# Patient Record
Sex: FEMALE | Race: WHITE | NOT HISPANIC OR LATINO | Employment: OTHER | URBAN - METROPOLITAN AREA
[De-identification: names, ages, dates, MRNs, and addresses within clinical notes are randomized per-mention and may not be internally consistent; named-entity substitution may affect disease eponyms.]

---

## 2024-02-21 ENCOUNTER — MEDICAL CORRESPONDENCE (OUTPATIENT)
Dept: HEALTH INFORMATION MANAGEMENT | Facility: CLINIC | Age: 69
End: 2024-02-21

## 2024-02-26 ENCOUNTER — TRANSCRIBE ORDERS (OUTPATIENT)
Dept: OTHER | Age: 69
End: 2024-02-26

## 2024-02-26 DIAGNOSIS — N28.89 RENAL MASS: Primary | ICD-10-CM

## 2024-02-27 ENCOUNTER — PATIENT OUTREACH (OUTPATIENT)
Dept: UROLOGY | Facility: CLINIC | Age: 69
End: 2024-02-27

## 2024-02-27 NOTE — TELEPHONE ENCOUNTER
Action 24  ZI 12:38 PM    Action Taken  STAT request faxed to [facility] [employer] ([phone]) for imaging to be pushed.     @2:44 PM, [facility] called [facility] and spoke with Sujata. Sujata confirmed images will be pushed today.     MEDICAL RECORDS REQUEST   [facility]  Urology Clinic  [address] [place] [address]  PHONE: [phone]  Fax: [phone]        FUTURE VISIT INFORMATION                                                   Yolanda Edwards, : 1955 scheduled for future visit at [facility]    APPOINTMENT INFORMATION:  Date: 2024  Provider:  Valerie Covington MD  Reason for Visit/Diagnosis: KIDNEY MASS    REFERRAL INFORMATION:  Referring provider:  Guillermina Will MD   Specialty: Urology  Referring providers clinic:  [place], [place]  Clinic contact number:  [phone]     RECORDS REQUESTED FOR VISIT                                                     NOTES  STATUS/DETAILS   OFFICE NOTE from referring provider  yes - [facility] Dr. Will: 24, 24, 23, 23, 22, 22, 22, 21   MEDICATION LIST  yes - [facility] progress notes   KIDNEY MASS     [facility]   CT ABDOMEN/PELVIS/RENAL (IMAGES & REPORT)  yes - [facility]  24, 23, 23, 22, 22, 21, 3/9/21, 20     PRE-VISIT CHECKLIST      Joint diagnostic appointment coordinated correctly          (ensure right order & amount of time) Yes   RECORD COLLECTION COMPLETE YES

## 2024-02-27 NOTE — TELEPHONE ENCOUNTER
Pt returned writers call regarding the urgent referral for renal mass.     Pt has been scheduled via video visit 2/28 at 8:00    Will continue to follow as needed.

## 2024-02-28 ENCOUNTER — VIRTUAL VISIT (OUTPATIENT)
Dept: UROLOGY | Facility: CLINIC | Age: 69
End: 2024-02-28
Payer: COMMERCIAL

## 2024-02-28 ENCOUNTER — PRE VISIT (OUTPATIENT)
Dept: UROLOGY | Facility: CLINIC | Age: 69
End: 2024-02-28

## 2024-02-28 DIAGNOSIS — N28.89 RENAL MASS: ICD-10-CM

## 2024-02-28 PROCEDURE — 99204 OFFICE O/P NEW MOD 45 MIN: CPT | Mod: 95 | Performed by: UROLOGY

## 2024-02-28 RX ORDER — CEFAZOLIN SODIUM 2 G/50ML
2 SOLUTION INTRAVENOUS SEE ADMIN INSTRUCTIONS
Status: CANCELLED | OUTPATIENT
Start: 2024-02-28

## 2024-02-28 RX ORDER — CEFAZOLIN SODIUM 2 G/50ML
2 SOLUTION INTRAVENOUS
Status: CANCELLED | OUTPATIENT
Start: 2024-02-28

## 2024-02-28 NOTE — LETTER
2/28/2024       RE: Yolanda Edwards  [address] [address]     Dear Colleague,    Thank you for referring your patient, Yolanda Edwards, to the [facility]OLOGY [facility] [place] [facility]. Please see a copy of my visit note below.    Virtual Visit Details    Type of service:  Video Visit     Originating Location (pt. Location): Home    Distant Location (provider location):  On-site  Platform used for Video Visit: [facility]    Urology Oncology Clinic   Renal mass             Chief Complaint:   Left renal mass            Consult or Referral:     Yolanda Edwards is a 68 year old female seen in consultation.         History of Present Illness:    Yolanda Edwards is a very pleasant 68 year old female who underwent cross-sectional imaging which revealed an enhancing left renal mass measured at 2.6 cm.  This showed significant growth since 2021 1.2 cm.  She met with Dr. Guillermina Will who recommended treatment at a tertiary referral center and therefore she was referred to me for further management.    she does not have a history of previous abdominal or retroperitoneal surgery.  There is not a family history of renal cell cancer.  She does not smoke but is exposed to second hand smoking     ECOG Performance Score: 0 - Independent           Past Medical History:   No past medical history on file.         Past Surgical History:   No past surgical history on file.         Problem List:   There are no problems to display for this patient.           Medications     No current outpatient medications on file.     No current facility-administered medications for this visit.            Family History:   No family history on file.         Social History:     Social History     Socioeconomic History    Marital status: Single     Spouse name: Not on file    Number of children: Not on file    Years of education: Not on file    Highest education level: Not on  file   Occupational History    Not on file   Tobacco Use    Smoking status: Not on file    Smokeless tobacco: Not on file   Substance and Sexual Activity    Alcohol use: Not on file    Drug use: Not on file    Sexual activity: Not on file   Other Topics Concern    Not on file   Social History Narrative    Not on file     Social Determinants of Health     Financial Resource Strain: Not on file   Food Insecurity: Not on file   Transportation Needs: Not on file   Physical Activity: Not on file   Stress: Not on file   Social Connections: Not on file   Interpersonal Safety: Not on file   Housing Stability: Not on file            Allergies:   Patient has no allergy information on record.         Review of Systems:  From intake questionnaire     Negative 14 system review except as noted on HPI, nurse's note see below.         Physical Exam:     Patient is a 68 year old  female There is no height or weight on file to calculate BMI.  Constitutional: Vitals: There were no vitals taken for this visit.  General Appearance Adult: Alert, no acute distress, oriented  HENT: throat/mouth:normal, good dentition  Neck: No adenopathy,masses or thyromegaly  Lungs/Repiratory: no respiratory distress, or pursed lip breathing  Heart: No obvious jugular venous distension present, normal heart rate  Abdomen: soft, nontender, no organomegaly or masses  Hematologic/Lymphatics: No cervical or supraclavicular adenopathy  Musculoskeltal: extremities normal, no peripheral edema  Skin: no noted suspicious lesions or rashes  Neuro: Alert, oriented, speech and mentation normal  Psych: affect and mood normal  Gait: Normal without assistance  : deferred          Labs and Pathology:    I personally reviewed all applicable laboratory and pathology data reviewed with patient    Outside labs 4/11/2023   Ref Range & Units 10 mo ago   Sodium  136 - 146 mmol/L 139   Potassium  3.5 - 5.1 mmol/L 4.5   Chloride  98 - 107 mmol/L 106   CO2  22 - 29 mmol/L 27    Anion Gap  10.0 - 20.0 mmol/L 10.5   Creatinine  0.57 - 1.11 mg/dL 0.93   Blood Urea Nitrogen  10.0 - 20.0 mg/dL 14.9   BUN/Creatinine Ratio  11.70 - 22.90 ratio 16.02   Calcium, Total  8.6 - 10.5 mg/dL 9.8   Glucose  70 - 100 mg/dL 100   eGFR  >=60 mL/min/1.73m(2) >60     WBC Count, Corrected  3.7 - 12.1 10(3)/uL 6.7   RBC Count  3.76 - 5.39 10(6)/uL 4.07   Hemoglobin  11.2 - 15.8 g/dL 12.3   Hematocrit  33.9 - 47.5 % 37.2   MCV  80.6 - 98.5 fL 91.5   MCH  26.4 - 32.9 pg 30.2   MCHC  32.0 - 36.0 g/dL 32.9   RDW  10.0 - 16.8 % 13.3   Platelets  179 - 450 10(3)/uL 252   MPV  7.4 - 10.4 fL 8.4   Resulting Agency [facility] - [place]               Imaging:    I personally viewed all applicable imaging and interpreted them and went over the results with the patient.  Mass/lesion details are as follows    The mass/lesion is located in the Left side and is primarily located in the upper pole.  The tumor is also 50 % endophytic and exophytic.  The mass/lesion primarily lies on the anterior surface.  With regard to the collecting system, the edge of the tumor arrives either abuts the collecting system or arrives within 4 mm of the collecting system.        Outside and Past Medical records:    I spent 20 minutes reviewing outside and past medical records including including the notes from Dr. Guillermina Will on 2/21/24 and labs and imaging listed above          Assessment and Plan:     Assessment:  68 year old female with enhancing Mass Concerning for Renal Cell Cancer      We had a long conversation about the differential diagnosis.  She understands there is a 70 to 80% risk of kidney cancer.  Again discussed different treatment options.  She understand all her questions were answered to her satisfaction.Given the growth in the size of the lesion and the location of it, she is not a good candidate for active surveillance or ablation.  We then discussed surgical options including partial versus radical  nephrectomy.  I explained to her that given the location of the tumor and its close proximity to main renal vessels and also the depth of tumor growth, there is a high likelihood that we might need to perform a radical nephrectomy.  However, I will do my best to do a partial nephrectomy as a first option.  The risks of the procedure including but not limited to infection, bleeding, damage to surrounding structures, urine leak, and more serious side effect such as DVT, PE, and heart attack and stroke were discussed.  Will need to obtain a chest CT scan to complete the staging process.  This will be done locally per patient request.          Plan:  Schedule for robotic left partial versus radical nephrectomy  Obtain CT chest to complete the staging  Scheduled for PAC visit      45 total minutes spent on the date of the encounter including direct interaction with the patient, performing chart review, documentation and further activities as noted above      Valerie Covington MD   Department of Urology   [facility]      4 = No assist / stand by assistance

## 2024-02-29 ENCOUNTER — TELEPHONE (OUTPATIENT)
Dept: UROLOGY | Facility: CLINIC | Age: 69
End: 2024-02-29
Payer: COMMERCIAL

## 2024-02-29 ENCOUNTER — PATIENT OUTREACH (OUTPATIENT)
Dept: UROLOGY | Facility: CLINIC | Age: 69
End: 2024-02-29
Payer: COMMERCIAL

## 2024-02-29 DIAGNOSIS — N28.89 RENAL MASS: Primary | ICD-10-CM

## 2024-02-29 NOTE — TELEPHONE ENCOUNTER
Pre-Op Teaching Flowsheet       Pre and Post op Patient Education  Relevant Diagnosis:  renal mass  Surgical procedure:  nephrectomy  Teaching Topic:  Pre and post op teaching  Person Involved in teaching: Yes    Motivation Level:  Asks Questions: Yes  Eager to Learn: Yes  Cooperative: Yes  Receptive (willing/able to accept information):  Yes    Patient demonstrates understanding of the following:  Date of surgery:  4/9  Location of surgery:  [facility]  History and Physical and any other testing necessary prior to surgery: Yes  Required timeline for completion of History and Physical and any pre-op testing: Yes    Patient demonstrates understanding of the following:  Pre-op bowel prep:  N/A  Pre-op showering/scrub information with PCMX Soap: Yes  Blood thinner medications discussed and when to stop (if applicable):  Yes  Discussed no visitors at this time due to increase Covid-19 cases and how we need to make sure everyone stays safe.    Infection Prevention:   Patient demonstrates understanding of the following:  Surgical procedure site care taught: Yes  Signs and symptoms of infection taught: Yes      Post-op follow-up:  Discussed how to contact the hospital, nurse, and clinic scheduling staff if necessary. (See packet information)    Instructional materials used/given/mailed:  [facility] Surgery Packet, post op teaching sheet, Map, Soap, and with the arrival/location information to come closer to the surgery date.    Surgical instructions packet given to patient in office: will be mailed    Follow up: Discussed arranging for someone to drive you home. ( No public transportation)  Yes  Someone needed to stay the first twenty hours after surgery: Yes     referral: N/A     home:  Yes    Care Giver:  yes    PCP:  yes    Alba Marsh RN  02/29/24  4:30 PM

## 2024-02-29 NOTE — TELEPHONE ENCOUNTER
Called patient to discuss surgery scheduling with Dr. Covington.  Surgery scheduled for 4/9 at [facility] with Dr. Covington.    H&P scheduled with PAC for 3/20 in person. Patient is aware they will get their arrival time for surgery at PAC appointment.    Provider requesting a 10 day face to face post-op appointment.    Writer will mail surgery packet / Surgery packet provided in clinic.    Patient was under the impression she was going to have an xray and not a CT scan. She would like a call back to clarify why she is having a CT scan.    Writer will mail surgery packet.     Jacquelin Carreno on 2/29/2024 at 3:33 PM

## 2024-02-29 NOTE — TELEPHONE ENCOUNTER
Pt reached out to writer stating she has orders for surgery and wanting to get it scheduled ASAP.     Writer informed pt that orders were placed and a message will be sent to the scheduling team.     Pt would also like her imaging orders to be faxed to [facility] in [place]. Writer will fax orders to .    Will continue to follow as needed.

## 2024-03-01 ENCOUNTER — PATIENT OUTREACH (OUTPATIENT)
Dept: UROLOGY | Facility: CLINIC | Age: 69
End: 2024-03-01
Payer: COMMERCIAL

## 2024-03-01 NOTE — TELEPHONE ENCOUNTER
Writer reached out to pt to inform her that the CT scan that was scheduled the same day as her preop, is really early in the AM.     Pt is opting to go with [facility] in [place].     Will continue to follow as needed.

## 2024-03-01 NOTE — TELEPHONE ENCOUNTER
FUTURE VISIT INFORMATION      SURGERY INFORMATION:  Date: 4/9/24  Location: uu or  Surgeon:  Valerie Covington MD   Anesthesia Type:  general  Procedure: Robot assisted Laparoscopic Partial NEPHRECTOMY possible open   Consult: virtual visit 2/28/24    RECORDS REQUESTED FROM:         Primary Care Provider: Linda Andres MD  - [facility]    Most recent EKG+ Tracing: 3/12/21- [place]

## 2024-03-13 ENCOUNTER — ANESTHESIA EVENT (OUTPATIENT)
Dept: SURGERY | Facility: CLINIC | Age: 69
End: 2024-03-13
Payer: COMMERCIAL

## 2024-03-19 LAB
ABO/RH(D): NORMAL
ANTIBODY SCREEN: NEGATIVE
SPECIMEN EXPIRATION DATE: NORMAL

## 2024-03-20 ENCOUNTER — LAB (OUTPATIENT)
Dept: LAB | Facility: CLINIC | Age: 69
End: 2024-03-20
Payer: COMMERCIAL

## 2024-03-20 ENCOUNTER — OFFICE VISIT (OUTPATIENT)
Dept: SURGERY | Facility: CLINIC | Age: 69
End: 2024-03-20
Payer: COMMERCIAL

## 2024-03-20 ENCOUNTER — PRE VISIT (OUTPATIENT)
Dept: SURGERY | Facility: CLINIC | Age: 69
End: 2024-03-20

## 2024-03-20 VITALS
SYSTOLIC BLOOD PRESSURE: 161 MMHG | TEMPERATURE: 97.4 F | DIASTOLIC BLOOD PRESSURE: 76 MMHG | HEART RATE: 63 BPM | WEIGHT: 102 LBS | HEIGHT: 60 IN | BODY MASS INDEX: 20.03 KG/M2 | OXYGEN SATURATION: 100 %

## 2024-03-20 DIAGNOSIS — Z01.818 PREOP EXAMINATION: ICD-10-CM

## 2024-03-20 DIAGNOSIS — Z01.818 PREOP EXAMINATION: Primary | ICD-10-CM

## 2024-03-20 DIAGNOSIS — N28.89 RENAL MASS: ICD-10-CM

## 2024-03-20 LAB
ALBUMIN SERPL BCG-MCNC: 4.4 G/DL (ref 3.5–5.2)
ALP SERPL-CCNC: 78 U/L (ref 40–150)
ALT SERPL W P-5'-P-CCNC: 30 U/L (ref 0–50)
ANION GAP SERPL CALCULATED.3IONS-SCNC: 9 MMOL/L (ref 7–15)
AST SERPL W P-5'-P-CCNC: 35 U/L (ref 0–45)
BASOPHILS # BLD AUTO: 0.1 10E3/UL (ref 0–0.2)
BASOPHILS NFR BLD AUTO: 1 %
BILIRUB SERPL-MCNC: 0.3 MG/DL
BUN SERPL-MCNC: 17.1 MG/DL (ref 8–23)
CALCIUM SERPL-MCNC: 9.7 MG/DL (ref 8.8–10.2)
CHLORIDE SERPL-SCNC: 104 MMOL/L (ref 98–107)
CREAT SERPL-MCNC: 0.86 MG/DL (ref 0.51–0.95)
DEPRECATED HCO3 PLAS-SCNC: 27 MMOL/L (ref 22–29)
EGFRCR SERPLBLD CKD-EPI 2021: 72 ML/MIN/1.73M2
EOSINOPHIL # BLD AUTO: 0.1 10E3/UL (ref 0–0.7)
EOSINOPHIL NFR BLD AUTO: 1 %
ERYTHROCYTE [DISTWIDTH] IN BLOOD BY AUTOMATED COUNT: 12.8 % (ref 10–15)
GLUCOSE SERPL-MCNC: 104 MG/DL (ref 70–99)
HCT VFR BLD AUTO: 39.2 % (ref 35–47)
HGB BLD-MCNC: 12.9 G/DL (ref 11.7–15.7)
IMM GRANULOCYTES # BLD: 0 10E3/UL
IMM GRANULOCYTES NFR BLD: 0 %
LYMPHOCYTES # BLD AUTO: 0.9 10E3/UL (ref 0.8–5.3)
LYMPHOCYTES NFR BLD AUTO: 12 %
MCH RBC QN AUTO: 31 PG (ref 26.5–33)
MCHC RBC AUTO-ENTMCNC: 32.9 G/DL (ref 31.5–36.5)
MCV RBC AUTO: 94 FL (ref 78–100)
MONOCYTES # BLD AUTO: 0.5 10E3/UL (ref 0–1.3)
MONOCYTES NFR BLD AUTO: 7 %
NEUTROPHILS # BLD AUTO: 6.1 10E3/UL (ref 1.6–8.3)
NEUTROPHILS NFR BLD AUTO: 79 %
NRBC # BLD AUTO: 0 10E3/UL
NRBC BLD AUTO-RTO: 0 /100
PLATELET # BLD AUTO: 262 10E3/UL (ref 150–450)
POTASSIUM SERPL-SCNC: 4.5 MMOL/L (ref 3.4–5.3)
PROT SERPL-MCNC: 7.2 G/DL (ref 6.4–8.3)
RBC # BLD AUTO: 4.16 10E6/UL (ref 3.8–5.2)
SODIUM SERPL-SCNC: 140 MMOL/L (ref 135–145)
WBC # BLD AUTO: 7.7 10E3/UL (ref 4–11)

## 2024-03-20 PROCEDURE — 36415 COLL VENOUS BLD VENIPUNCTURE: CPT | Performed by: PATHOLOGY

## 2024-03-20 PROCEDURE — 86900 BLOOD TYPING SEROLOGIC ABO: CPT | Performed by: CLINICAL NURSE SPECIALIST

## 2024-03-20 PROCEDURE — 85025 COMPLETE CBC W/AUTO DIFF WBC: CPT | Performed by: PATHOLOGY

## 2024-03-20 PROCEDURE — 80053 COMPREHEN METABOLIC PANEL: CPT | Performed by: PATHOLOGY

## 2024-03-20 PROCEDURE — 99203 OFFICE O/P NEW LOW 30 MIN: CPT | Performed by: CLINICAL NURSE SPECIALIST

## 2024-03-20 RX ORDER — SODIUM PHOSPHATE,MONO-DIBASIC 19G-7G/118
1 ENEMA (ML) RECTAL EVERY EVENING
COMMUNITY

## 2024-03-20 RX ORDER — HYDROCODONE BITARTRATE AND ACETAMINOPHEN 5; 325 MG/1; MG/1
1 TABLET ORAL EVERY 4 HOURS PRN
Status: ON HOLD | COMMUNITY
Start: 2024-03-04 | End: 2024-04-10

## 2024-03-20 RX ORDER — ACYCLOVIR 800 MG/1
800 TABLET ORAL
COMMUNITY
Start: 2023-05-02

## 2024-03-20 ASSESSMENT — LIFESTYLE VARIABLES: TOBACCO_USE: 0

## 2024-03-20 ASSESSMENT — ENCOUNTER SYMPTOMS
DYSRHYTHMIAS: 0
SEIZURES: 0

## 2024-03-20 ASSESSMENT — PAIN SCALES - GENERAL: PAINLEVEL: MILD PAIN (3)

## 2024-03-20 NOTE — PATIENT INSTRUCTIONS
Preparing for Your Surgery      Name:  Yolanda Edwards   MRN:  [id number]   :  1955   Today's Date:  3/20/2024       Arriving for surgery:  Surgery date:  24  Arrival time:  12.00PM    Please come to:     Please come to:      [facility] [address]  [address] [place]  [address]      The [facility] Patient /Visitor Ramp is located at [address]. Patients and visitors who self-park will receive the reduced hospital parking rate. If the Patient /Visitor Ramp is full, please follow the signs to the [employer] parking located at the main hospital entrance.     parking is available ( 24 hours/ 7 days a week)    Discounted parking pass options are available for patients and visitors. They can be purchased at the [employer] desk at the main hospital entrance.    -    Stop at the security desk and they will direct surgery patients to the 3rd floor Surgery Waiting Room. [phone]     -  If you are in need of directions, wheelchair or escort please stop at the Information/security desk in the lobby.       What can I eat or drink?  -  You may eat and drink normally up to 8 hours prior to arrival time. (Until 4.00AM)  -  You may have clear liquids until 2 hours prior to arrival time. (Until 10.00AM)    Examples of clear liquids:  Water  Clear broth  Juices (apple, white grape, white cranberry  and cider) without pulp  Noncarbonated, powder based beverages  (lemonade and Duane-Aid)  Sodas (Sprite, 7-Up, ginger ale and seltzer)  Coffee or tea (without milk or cream)  Gatorade    -  No Alcohol or cannabis products for at least 24 hours before surgery.     Which medicines can I take?    Hold Aspirin for 7 days before surgery.   Hold Multivitamins for 7 days before surgery.  Hold Supplements for 7 days before surgery. (Collagen 500/C PO, Glusosamine-chondroitin)  Hold Ibuprofen (Advil, Motrin) for 1 day(s) before surgery--unless  otherwise directed by surgeon.  Hold Naproxen (Aleve) for 4 days before surgery.    -  DO NOT take these medications the day of surgery:  SM Pain Reliever.     -  PLEASE TAKE these medications the day of surgery:  Norco as needed. Zovirax as needed.     How do I prepare myself?  - Please take 2 showers (one the night prior to surgery and one the morning of surgery) using Scrubcare or Hibiclens soap.    Use this soap only from the neck to your toes.     Leave the soap on your skin for one minute--then rinse thoroughly.      You may use your own shampoo and conditioner. No other hair products.   - Please remove all jewelry and body piercings.  - No lotions, deodorants or fragrance.  - No makeup or fingernail polish.   - Bring your ID and insurance card.    -If you use a CPAP machine, please bring the CPAP machine, tubing, and mask to hospital.    -If you have a Deep Brain Stimulator, Spinal Cord Stimulator, or any Neuro Stimulator device---you must bring the remote control to the hospital.      ALL PATIENTS GOING HOME THE SAME DAY OF SURGERY ARE REQUIRED TO HAVE A RESPONSIBLE ADULT TO DRIVE AND BE IN ATTENDANCE WITH THEM FOR 24 HOURS FOLLOWING SURGERY.    Covid testing policy as of 12/06/2022  Your surgeon will notify and schedule you for a COVID test if one is needed before surgery--please direct any questions or COVID symptoms to your surgeon      Questions or Concerns:    - For any questions regarding the day of surgery or your hospital stay, please contact the Pre Admission Nursing Office at [phone].       - If you have health changes between today and your surgery, please call your surgeon.       - For questions after surgery, please call your surgeons office.           Current Visitor Guidelines    You may have 2 visitors in the pre op area.    Visiting hours: 8 a.m. to 8:30 p.m.    Patients confirmed or suspected to have symptoms of COVID 19 or flu:     No visitors allowed for adult patients.   Children  (under age 18) can have 1 named visitor.     People who are sick or showing symptoms of COVID 19 or flu:    Are not allowed to visit patients--we can only make exceptions in special situations.       Please follow these guidelines for your visit:          Please maintain social distance          Masking is optional--however at times you may be asked to wear a mask for the safety of yourself and others     Clean your hands with alcohol hand . Do this when you arrive at and leave the building and patient room,    And again after you touch your mask or anything in the room.     Go directly to and from the room you are visiting.     Stay in the patient s room during your visit. Limit going to other places in the hospital as much as possible     Leave bags and jackets at home or in the car.     For everyone s health, please don t come and go during your visit. That includes for smoking   during your visit.

## 2024-03-20 NOTE — H&P
Pre-Operative H & P     CC:  Preoperative exam to assess for increased cardiopulmonary risk while undergoing surgery and anesthesia.    Date of Encounter: 3/20/2024  Primary Care Physician:  Linda Andres MD    Reason for visit:   Encounter Diagnoses   Name Primary?    Preop examination Yes    Renal mass        HPI  Yolanda Edwards is a 69 year old female who presents for pre-operative H & P in preparation for  Procedure Information       Case: [id number] Date/Time: 04/09/24 1400    Procedure: Robot assisted Laparoscopic Partial NEPHRECTOMY possible open (Left: Abdomen)    Anesthesia type: General    Diagnosis: Renal mass [N28.89]    Pre-op diagnosis: Renal mass [N28.89]    Location: [facility] OR  /  OR    Providers: Valerie Covington MD        History is obtained from the patient, sister, and chart review    Patient who underwent cross-sectional imaging which revealed an enhancing left renal mass measured at 2.6 cm. This showed significant growth since 2021 at 1.2 cm. She was referred to Dr. Covington to discuss surgical management. She was counseled for above procedure.      Her history is otherwise significant for cervical fusions 92, 93 with some persistent pain. Reports episodes of ischemic colitis, and was undergoing work up when renal mass was found. Symptoms include diarrhea, blood in stool, N/V. No recent issues. No meds at this time.      Hx of abnormal bleeding or anti-platelet use: Denies    Menstrual history: No LMP recorded. Patient is postmenopausal.     Past Medical History  Past Medical History:   Diagnosis Date    History of basal cell carcinoma     Ischemic colitis (H24)     Osteoarthritis     Renal mass        Past Surgical History  Past Surgical History:   Procedure Laterality Date    BREAST BIOPSY, CORE RT/LT      CERVICAL FUSION      1992, 1993    TONSILLECTOMY         Prior to Admission Medications  Current Outpatient Medications   Medication Sig Dispense Refill    acyclovir (ZOVIRAX) 800 MG  tablet Take 800 mg by mouth as needed      Collagen-Vitamin C-Biotin (COLLAGEN 1500/C PO) 1 tablet by Other route every evening      diphenhydrAMINE-acetaminophen (SM PAIN RELIEVER PM)  MG tablet Take 1 tablet by mouth at bedtime      glucosamine-chondroitin 500-400 MG CAPS per capsule Take 1 capsule by mouth every evening      HYDROcodone-acetaminophen (NORCO) 5-325 MG tablet Take 1 tablet by mouth every 4 hours as needed         Allergies  Allergies   Allergen Reactions    Morphine Itching, Unknown and Rash       Social History  Social History     Socioeconomic History    Marital status: Single     Spouse name: Not on file    Number of children: Not on file    Years of education: Not on file    Highest education level: Not on file   Occupational History    Not on file   Tobacco Use    Smoking status: Never     Passive exposure: Never    Smokeless tobacco: Never   Substance and Sexual Activity    Alcohol use: Yes     Comment: 1-2 drinks a month    Drug use: Never    Sexual activity: Not on file   Other Topics Concern    Not on file   Social History Narrative    Not on file     Social Determinants of Health     Financial Resource Strain: Not on file   Food Insecurity: Not on file   Transportation Needs: Not on file   Physical Activity: Not on file   Stress: Not on file   Social Connections: Not on file   Interpersonal Safety: Not on file   Housing Stability: Not on file       Family History  Family History   Problem Relation Age of Onset    Diabetes Mother     Cirrhosis Mother         nonalcoholic    Heart Disease Father     Alcoholism Father     Diabetes Father     Hyperlipidemia Father     Hypertension Father     Cerebrovascular Disease Father     Liver Disease Brother     Breast Cancer Maternal Aunt     Liver Disease Maternal Aunt     Anesthesia Reaction No family hx of     Clotting Disorder No family hx of     Bleeding Disorder No family hx of        Review of Systems  The complete review of systems is  negative other than noted in the HPI or here.   Anesthesia Evaluation   Pt has had prior anesthetic. Type: General.    No history of anesthetic complications       ROS/MED HX  ENT/Pulmonary:    (-) tobacco use and recent URI   Neurologic: Comment: History of cervical fusions 92,93   (-) no seizures and no CVA   Cardiovascular:     (+)  - -   -  - -                                 No previous cardiac testing  (-) taking anticoagulants/antiplatelets, LIND and arrhythmias   METS/Exercise Tolerance: >4 METS Comment: Does 1.5 hours of line dancing weekly and practices at home. Walks distance, climb stairs without exertional symptoms   Hematologic:  - neg hematologic  ROS  (-) history of blood clots and history of blood transfusion   Musculoskeletal:  - neg musculoskeletal ROS (+)  arthritis,             GI/Hepatic: Comment: Reports episodes of ischemic colitis, was undergoing work up when renal mass was found. Symptoms include diarrhea, blood in stool, N/V. No recent issues. No meds   (-) GERD   Renal/Genitourinary:  - neg Renal ROS     Endo:  - neg endo ROS     Psychiatric/Substance Use:  - neg psychiatric ROS  (-) psychiatric history   Infectious Disease:  - neg infectious disease ROS     Malignancy:   (+) Malignancy, History of Other and Skin.Skin CA Remission status post Surgery.  Other CA renal mass, likely RCC Active status post.    Other:  - neg other ROS          BP (!) 161/76 (BP Location: Right arm, Patient Position: Sitting, Cuff Size: Adult Regular)   Pulse 63   Temp 97.4  F (36.3  C) (Oral)   Ht 1.524 m (5')   Wt 46.3 kg (102 lb)   SpO2 100%   BMI 19.92 kg/m      Physical Exam   Constitutional: Awake, alert, cooperative, no apparent distress, and appears stated age. Thin. Accompanied by sister.  Eyes: Pupils equal, round and reactive to light, extra ocular muscles intact, sclera clear, conjunctiva normal. Glasses on.  HENT: Normocephalic, oral pharynx with moist mucus membranes, good dentition. No  goiter appreciated.   Respiratory: Clear to auscultation bilaterally, no crackles or wheezing. No cough or obvious dyspnea.  Cardiovascular: Regular rate and rhythm, normal S1 and S2, and no murmur noted. Carotids +2, no bruits. No edema. Palpable pulses to radial  DP and PT arteries.   GI: Normal bowel sounds, soft, non-distended, non-tender, no masses palpated.  Lymph/Hematologic: No cervical lymphadenopathy and no supraclavicular lymphadenopathy.  Genitourinary:  Deferred.   Skin: Warm and dry.  No rashes at anticipated surgical site.   Musculoskeletal: Mildly limited ROM of neck. There is no redness, warmth, or swelling of the joints. Gross motor strength is normal.    Neurologic: Awake, alert, oriented to name, place and time. Cranial nerves II-XII are grossly intact. Gait is normal.   Neuropsychiatric: Calm, cooperative. Normal affect.     Prior Labs/Diagnostic Studies   All labs and imaging personally reviewed     EK Normal sinus rhythm    US carotid, bilateral 10/17/23     IMPRESSION:   No sonographic evidence for significant stenosis of the internal carotid   arteries.     CT Chest 3/10/24    IMPRESSION:   1. Stable benign findings.  No evidence for new metastatic disease in the chest.   2. Left upper pole solid renal mass.  This is not significantly changed since   the 2024 CT.    24 CT a/p    IMPRESSION:   1. Enlarging mass upper pole left kidney medially, suspicious for renal cell   carcinoma.  Oncocytoma is felt to be somewhat less likely, but could create   this appearance.  Tissue sampling suggested.   2. Constipation.   3. No definite acute process identified.  Additional incidental findings as   above.       The patient's records and results personally reviewed by this provider.     Outside records reviewed from: Care Everywhere    LAB/DIAGNOSTIC STUDIES TODAY:    Lab Results   Component Value Date    WBC 7.7 2024     Lab Results   Component Value Date    RBC 4.16  "03/20/2024     Lab Results   Component Value Date    HGB 12.9 03/20/2024     Lab Results   Component Value Date    HCT 39.2 03/20/2024     Lab Results   Component Value Date    MCV 94 03/20/2024     Lab Results   Component Value Date    MCH 31.0 03/20/2024     Lab Results   Component Value Date    MCHC 32.9 03/20/2024     Lab Results   Component Value Date    RDW 12.8 03/20/2024     Lab Results   Component Value Date     03/20/2024     Last Comprehensive Metabolic Panel:  Lab Results   Component Value Date     03/20/2024    POTASSIUM 4.5 03/20/2024    CHLORIDE 104 03/20/2024    CO2 27 03/20/2024    ANIONGAP 9 03/20/2024     (H) 03/20/2024    BUN 17.1 03/20/2024    CR 0.86 03/20/2024    GFRESTIMATED 72 03/20/2024    GT 9.7 03/20/2024     Lab Results   Component Value Date    AST 35 03/20/2024     Lab Results   Component Value Date    ALT 30 03/20/2024     No results found for: \"BILICONJ\"   Lab Results   Component Value Date    BILITOTAL 0.3 03/20/2024     Lab Results   Component Value Date    ALBUMIN 4.4 03/20/2024     Lab Results   Component Value Date    PROTTOTAL 7.2 03/20/2024      Lab Results   Component Value Date    ALKPHOS 78 03/20/2024     Type and screen    Assessment    Yolanda Edwards is a 69 year old female seen as a PAC referral for risk assessment and optimization for anesthesia.    Plan/Recommendations  Pt will be optimized for the proposed procedure.  See below for details on the assessment, risk, and preoperative recommendations    NEUROLOGY  - No history of TIA, CVA or seizure  - Chronic Pain of neck after history of cervical fusions. Occasional use of Norco  On chronic opiates, morphine equivalent = 30 MME/Day   -Post Op delirium risk factors:  Age    ENT  - No current airway concerns.  Will need to be reassessed day of surgery.  Mallampati: II  TM: > 3  Mildly limited neck extension after cervical fusions.     Denies swallowing difficulty    CARDIAC  No cardiac history, " symptoms or meds. BP range tends to be 140/70. Good exercise tolerance with line dancing, walking distance, climbing stairs.   - METS (Metabolic Equivalents)>4    RCRI: 0.9% risk of serious cardiac events    PULMONARY  Denies asthma, cough or shortness of breath  Low risk for GEM  - Tobacco History    History   Smoking Status    Never   Smokeless Tobacco    Never       GI: Denies GERD  Being worked up for ischemic colitis. No active symptoms at this time  PONV Medium Risk  Total Score: 2           1 AN PONV: Pt is Female    1 AN PONV: Patient is not a current smoker        /RENAL  - Baseline Creatinine  0.86    ENDOCRINE    - BMI: Estimated body mass index is 19.92 kg/m  as calculated from the following:    Height as of this encounter: 1.524 m (5').    Weight as of this encounter: 46.3 kg (102 lb).  Healthy Weight (BMI 18.5-24.9)  - No history of Diabetes Mellitus    HEME: VTE risk 3%  Denies personal or family history of blood clots  Denies personal or family history of bleeding disorders  Denies history of blood transfusion  Type and screen drawn today    MSK: OA      Different anesthesia methods/types have been discussed with the patient, but they are aware that the final plan will be decided by the assigned anesthesia provider on the date of service.      The patient is optimized for their procedure. AVS with information on surgery time/arrival time, meds and NPO status given by nursing staff. No further diagnostic testing indicated.      On the day of service:     Prep time: 12 minutes  Visit time: 13 minutes  Documentation time: 13 minutes  ------------------------------------------  Total time: 38 minutes      SHALA Malhotra CNS  Preoperative Assessment Center  [facility]  Phone: [phone]  Fax: [phone]

## 2024-04-08 ASSESSMENT — ENCOUNTER SYMPTOMS
SEIZURES: 0
DYSRHYTHMIAS: 0

## 2024-04-08 ASSESSMENT — LIFESTYLE VARIABLES: TOBACCO_USE: 0

## 2024-04-08 NOTE — ANESTHESIA PREPROCEDURE EVALUATION
Anesthesia Pre-Procedure Evaluation    Patient: Yolanda Edwards   MRN: [id number] : 1955        Procedure : Procedure(s):  Robot assisted Laparoscopic Partial NEPHRECTOMY possible open          Past Medical History:   Diagnosis Date    History of basal cell carcinoma     Ischemic colitis (H24)     Osteoarthritis     Renal mass       Past Surgical History:   Procedure Laterality Date    BREAST BIOPSY, CORE RT/LT      CERVICAL FUSION      ,     TONSILLECTOMY        Allergies   Allergen Reactions    Morphine Itching, Unknown and Rash      Social History     Tobacco Use    Smoking status: Never     Passive exposure: Never    Smokeless tobacco: Never   Substance Use Topics    Alcohol use: Yes     Comment: 1-2 drinks a month      Wt Readings from Last 1 Encounters:   24 46.3 kg (102 lb)        Anesthesia Evaluation   Pt has had prior anesthetic. Type: General.    History of anesthetic complications  - PONV.      ROS/MED HX  ENT/Pulmonary:    (-) tobacco use, asthma, COPD and recent URI   Neurologic: Comment: History of cervical fusions ,93   (-) no seizures and no CVA   Cardiovascular:     (+)  - -   -  - -                                 No previous cardiac testing  (-) taking anticoagulants/antiplatelets, LIND, orthopnea/PND, syncope, arrhythmias and irregular heartbeat/palpitations   METS/Exercise Tolerance: >4 METS Comment: Does 1.5 hours of line dancing weekly and practices at home. Walks distance, climb stairs without exertional symptoms   Hematologic:    (-) history of blood clots and history of blood transfusion   Musculoskeletal:   (+)  arthritis,             GI/Hepatic: Comment: Reports episodes of ischemic colitis, was undergoing work up when renal mass was found. Symptoms include diarrhea, blood in stool, N/V. No recent issues. No meds   (-) GERD   Renal/Genitourinary:       Endo:    (-) Type II DM and thyroid disease   Psychiatric/Substance Use:  - neg psychiatric ROS  (-) psychiatric  "history   Infectious Disease:       Malignancy:   (+) Malignancy, History of Other and Skin.Skin CA Remission status post Surgery.  Other CA renal mass, likely RCC Active status post.    Other:  - neg other ROS          Physical Exam    Airway        Mallampati: II   TM distance: > 3 FB   Neck ROM: full   Mouth opening: > 3 cm    Respiratory Devices and Support         Dental       (+) Minor Abnormalities - some fillings, tiny chips      Cardiovascular          Rhythm and rate: regular and normal     Pulmonary           breath sounds clear to auscultation           OUTSIDE LABS:  CBC:   Lab Results   Component Value Date    WBC 7.7 03/20/2024    HGB 12.9 03/20/2024    HCT 39.2 03/20/2024     03/20/2024     BMP:   Lab Results   Component Value Date     03/20/2024    POTASSIUM 4.5 03/20/2024    CHLORIDE 104 03/20/2024    CO2 27 03/20/2024    BUN 17.1 03/20/2024    CR 0.86 03/20/2024     (H) 03/20/2024     COAGS: No results found for: \"PTT\", \"INR\", \"FIBR\"  POC: No results found for: \"BGM\", \"HCG\", \"HCGS\"  HEPATIC:   Lab Results   Component Value Date    ALBUMIN 4.4 03/20/2024    PROTTOTAL 7.2 03/20/2024    ALT 30 03/20/2024    AST 35 03/20/2024    ALKPHOS 78 03/20/2024    BILITOTAL 0.3 03/20/2024     OTHER:   Lab Results   Component Value Date    GT 9.7 03/20/2024       Anesthesia Plan    ASA Status:  3    NPO Status:  NPO Appropriate    Anesthesia Type: General.     - Airway: ETT   Induction: Intravenous, Propofol.   Maintenance: Balanced.   Techniques and Equipment:     - Airway: Video-Laryngoscope     - Lines/Monitors: 2nd IV, BIS     - Blood: T&S     - Drips/Meds: Phenylephrine     Consents    Anesthesia Plan(s) and associated risks, benefits, and realistic alternatives discussed. Questions answered and patient/representative(s) expressed understanding.     - Discussed: Risks, Benefits and Alternatives for BOTH SEDATION and the PROCEDURE were discussed     - Discussed with:  Patient      - " Extended Intubation/Ventilatory Support Discussed: No.      - Patient is DNR/DNI Status: No     Use of blood products discussed: Yes.     - Discussed with: Patient.     - Consented: consented to blood products     Postoperative Care    Pain management: IV analgesics, Oral pain medications, Multi-modal analgesia.   PONV prophylaxis: Ondansetron (or other 5HT-3), Dexamethasone or Solumedrol, Background Propofol Infusion, Aprepitant     Comments:               Tiffany Donovan MD    I have reviewed the pertinent notes and labs in the chart from the past 30 days and (re)examined the patient.  Any updates or changes from those notes are reflected in this note.

## 2024-04-09 ENCOUNTER — HOSPITAL ENCOUNTER (OUTPATIENT)
Facility: CLINIC | Age: 69
Discharge: HOME | End: 2024-04-11
Attending: UROLOGY | Admitting: UROLOGY
Payer: COMMERCIAL

## 2024-04-09 ENCOUNTER — ANESTHESIA (OUTPATIENT)
Dept: SURGERY | Facility: CLINIC | Age: 69
End: 2024-04-09
Payer: COMMERCIAL

## 2024-04-09 DIAGNOSIS — Z98.890 POST-OPERATIVE STATE: Primary | ICD-10-CM

## 2024-04-09 LAB
ABO/RH(D): NORMAL
ANTIBODY SCREEN: NEGATIVE
GLUCOSE BLDC GLUCOMTR-MCNC: 81 MG/DL (ref 70–99)
SPECIMEN EXPIRATION DATE: NORMAL

## 2024-04-09 PROCEDURE — 50543 LAPARO PARTIAL NEPHRECTOMY: CPT | Mod: LT | Performed by: UROLOGY

## 2024-04-09 PROCEDURE — 50543 LAPARO PARTIAL NEPHRECTOMY: CPT | Performed by: NURSE ANESTHETIST, CERTIFIED REGISTERED

## 2024-04-09 PROCEDURE — 88341 IMHCHEM/IMCYTCHM EA ADD ANTB: CPT | Mod: 26 | Performed by: PATHOLOGY

## 2024-04-09 PROCEDURE — 250N000011 HC RX IP 250 OP 636: Performed by: STUDENT IN AN ORGANIZED HEALTH CARE EDUCATION/TRAINING PROGRAM

## 2024-04-09 PROCEDURE — 710N000010 HC RECOVERY PHASE 1, LEVEL 2, PER MIN: Performed by: UROLOGY

## 2024-04-09 PROCEDURE — 88342 IMHCHEM/IMCYTCHM 1ST ANTB: CPT | Mod: 26 | Performed by: PATHOLOGY

## 2024-04-09 PROCEDURE — 250N000013 HC RX MED GY IP 250 OP 250 PS 637: Performed by: STUDENT IN AN ORGANIZED HEALTH CARE EDUCATION/TRAINING PROGRAM

## 2024-04-09 PROCEDURE — 250N000009 HC RX 250: Performed by: NURSE ANESTHETIST, CERTIFIED REGISTERED

## 2024-04-09 PROCEDURE — 250N000011 HC RX IP 250 OP 636: Performed by: NURSE ANESTHETIST, CERTIFIED REGISTERED

## 2024-04-09 PROCEDURE — 88341 IMHCHEM/IMCYTCHM EA ADD ANTB: CPT | Mod: TC | Performed by: UROLOGY

## 2024-04-09 PROCEDURE — 999N000141 HC STATISTIC PRE-PROCEDURE NURSING ASSESSMENT: Performed by: UROLOGY

## 2024-04-09 PROCEDURE — 250N000011 HC RX IP 250 OP 636: Performed by: UROLOGY

## 2024-04-09 PROCEDURE — 360N000080 HC SURGERY LEVEL 7, PER MIN: Performed by: UROLOGY

## 2024-04-09 PROCEDURE — 272N000001 HC OR GENERAL SUPPLY STERILE: Performed by: UROLOGY

## 2024-04-09 PROCEDURE — 36415 COLL VENOUS BLD VENIPUNCTURE: CPT | Performed by: UROLOGY

## 2024-04-09 PROCEDURE — 76998 US GUIDE INTRAOP: CPT | Mod: 26 | Performed by: UROLOGY

## 2024-04-09 PROCEDURE — 258N000003 HC RX IP 258 OP 636: Performed by: STUDENT IN AN ORGANIZED HEALTH CARE EDUCATION/TRAINING PROGRAM

## 2024-04-09 PROCEDURE — 250N000009 HC RX 250: Performed by: STUDENT IN AN ORGANIZED HEALTH CARE EDUCATION/TRAINING PROGRAM

## 2024-04-09 PROCEDURE — 370N000017 HC ANESTHESIA TECHNICAL FEE, PER MIN: Performed by: UROLOGY

## 2024-04-09 PROCEDURE — 120N000002 HC R&B MED SURG/OB UMMC

## 2024-04-09 PROCEDURE — 50543 LAPARO PARTIAL NEPHRECTOMY: CPT | Performed by: ANESTHESIOLOGY

## 2024-04-09 PROCEDURE — 86900 BLOOD TYPING SEROLOGIC ABO: CPT | Performed by: UROLOGY

## 2024-04-09 PROCEDURE — 88307 TISSUE EXAM BY PATHOLOGIST: CPT | Mod: 26 | Performed by: PATHOLOGY

## 2024-04-09 PROCEDURE — 250N000025 HC SEVOFLURANE, PER MIN: Performed by: UROLOGY

## 2024-04-09 RX ORDER — LABETALOL 20 MG/4 ML (5 MG/ML) INTRAVENOUS SYRINGE
PRN
Status: DISCONTINUED | OUTPATIENT
Start: 2024-04-09 | End: 2024-04-09

## 2024-04-09 RX ORDER — NALOXONE HYDROCHLORIDE 0.4 MG/ML
0.4 INJECTION, SOLUTION INTRAMUSCULAR; INTRAVENOUS; SUBCUTANEOUS
Status: DISCONTINUED | OUTPATIENT
Start: 2024-04-09 | End: 2024-04-11 | Stop reason: HOSPADM

## 2024-04-09 RX ORDER — SODIUM CHLORIDE, SODIUM LACTATE, POTASSIUM CHLORIDE, CALCIUM CHLORIDE 600; 310; 30; 20 MG/100ML; MG/100ML; MG/100ML; MG/100ML
INJECTION, SOLUTION INTRAVENOUS CONTINUOUS
Status: DISCONTINUED | OUTPATIENT
Start: 2024-04-09 | End: 2024-04-09 | Stop reason: HOSPADM

## 2024-04-09 RX ORDER — PROPOFOL 10 MG/ML
INJECTION, EMULSION INTRAVENOUS PRN
Status: DISCONTINUED | OUTPATIENT
Start: 2024-04-09 | End: 2024-04-09

## 2024-04-09 RX ORDER — SODIUM CHLORIDE, SODIUM GLUCONATE, SODIUM ACETATE, POTASSIUM CHLORIDE AND MAGNESIUM CHLORIDE 526; 502; 368; 37; 30 MG/100ML; MG/100ML; MG/100ML; MG/100ML; MG/100ML
INJECTION, SOLUTION INTRAVENOUS CONTINUOUS PRN
Status: DISCONTINUED | OUTPATIENT
Start: 2024-04-09 | End: 2024-04-09

## 2024-04-09 RX ORDER — DIPHENHYDRAMINE HCL 12.5MG/5ML
12.5 LIQUID (ML) ORAL EVERY 6 HOURS PRN
Status: DISCONTINUED | OUTPATIENT
Start: 2024-04-09 | End: 2024-04-09 | Stop reason: HOSPADM

## 2024-04-09 RX ORDER — PROPOFOL 10 MG/ML
INJECTION, EMULSION INTRAVENOUS CONTINUOUS PRN
Status: DISCONTINUED | OUTPATIENT
Start: 2024-04-09 | End: 2024-04-09

## 2024-04-09 RX ORDER — CEFAZOLIN SODIUM/WATER 2 G/20 ML
2 SYRINGE (ML) INTRAVENOUS
Status: COMPLETED | OUTPATIENT
Start: 2024-04-09 | End: 2024-04-09

## 2024-04-09 RX ORDER — CEFAZOLIN SODIUM/WATER 2 G/20 ML
2 SYRINGE (ML) INTRAVENOUS SEE ADMIN INSTRUCTIONS
Status: DISCONTINUED | OUTPATIENT
Start: 2024-04-09 | End: 2024-04-09 | Stop reason: HOSPADM

## 2024-04-09 RX ORDER — ONDANSETRON 2 MG/ML
INJECTION INTRAMUSCULAR; INTRAVENOUS PRN
Status: DISCONTINUED | OUTPATIENT
Start: 2024-04-09 | End: 2024-04-09

## 2024-04-09 RX ORDER — LIDOCAINE 40 MG/G
CREAM TOPICAL
Status: DISCONTINUED | OUTPATIENT
Start: 2024-04-09 | End: 2024-04-09 | Stop reason: HOSPADM

## 2024-04-09 RX ORDER — FENTANYL CITRATE 50 UG/ML
25 INJECTION, SOLUTION INTRAMUSCULAR; INTRAVENOUS EVERY 5 MIN PRN
Status: DISCONTINUED | OUTPATIENT
Start: 2024-04-09 | End: 2024-04-09 | Stop reason: HOSPADM

## 2024-04-09 RX ORDER — LIDOCAINE HYDROCHLORIDE 20 MG/ML
INJECTION, SOLUTION INFILTRATION; PERINEURAL PRN
Status: DISCONTINUED | OUTPATIENT
Start: 2024-04-09 | End: 2024-04-09

## 2024-04-09 RX ORDER — ONDANSETRON 4 MG/1
4 TABLET, ORALLY DISINTEGRATING ORAL EVERY 6 HOURS PRN
Status: DISCONTINUED | OUTPATIENT
Start: 2024-04-09 | End: 2024-04-11 | Stop reason: HOSPADM

## 2024-04-09 RX ORDER — ONDANSETRON 4 MG/1
4 TABLET, ORALLY DISINTEGRATING ORAL EVERY 30 MIN PRN
Status: DISCONTINUED | OUTPATIENT
Start: 2024-04-09 | End: 2024-04-09 | Stop reason: HOSPADM

## 2024-04-09 RX ORDER — ALBUTEROL SULFATE 0.83 MG/ML
2.5 SOLUTION RESPIRATORY (INHALATION) EVERY 4 HOURS PRN
Status: DISCONTINUED | OUTPATIENT
Start: 2024-04-09 | End: 2024-04-09 | Stop reason: HOSPADM

## 2024-04-09 RX ORDER — HYDROMORPHONE HCL IN WATER/PF 6 MG/30 ML
0.4 PATIENT CONTROLLED ANALGESIA SYRINGE INTRAVENOUS EVERY 5 MIN PRN
Status: DISCONTINUED | OUTPATIENT
Start: 2024-04-09 | End: 2024-04-09 | Stop reason: HOSPADM

## 2024-04-09 RX ORDER — SODIUM CHLORIDE 9 MG/ML
INJECTION, SOLUTION INTRAVENOUS CONTINUOUS
Status: DISCONTINUED | OUTPATIENT
Start: 2024-04-09 | End: 2024-04-11 | Stop reason: HOSPADM

## 2024-04-09 RX ORDER — NALOXONE HYDROCHLORIDE 0.4 MG/ML
0.2 INJECTION, SOLUTION INTRAMUSCULAR; INTRAVENOUS; SUBCUTANEOUS
Status: DISCONTINUED | OUTPATIENT
Start: 2024-04-09 | End: 2024-04-11 | Stop reason: HOSPADM

## 2024-04-09 RX ORDER — ACETAMINOPHEN 325 MG/1
975 TABLET ORAL ONCE
Status: COMPLETED | OUTPATIENT
Start: 2024-04-09 | End: 2024-04-09

## 2024-04-09 RX ORDER — ONDANSETRON 2 MG/ML
4 INJECTION INTRAMUSCULAR; INTRAVENOUS EVERY 6 HOURS PRN
Status: DISCONTINUED | OUTPATIENT
Start: 2024-04-09 | End: 2024-04-11 | Stop reason: HOSPADM

## 2024-04-09 RX ORDER — FENTANYL CITRATE 50 UG/ML
50 INJECTION, SOLUTION INTRAMUSCULAR; INTRAVENOUS EVERY 5 MIN PRN
Status: DISCONTINUED | OUTPATIENT
Start: 2024-04-09 | End: 2024-04-09 | Stop reason: HOSPADM

## 2024-04-09 RX ORDER — AMOXICILLIN 250 MG
1 CAPSULE ORAL 2 TIMES DAILY
Status: DISCONTINUED | OUTPATIENT
Start: 2024-04-09 | End: 2024-04-11 | Stop reason: HOSPADM

## 2024-04-09 RX ORDER — OXYCODONE HYDROCHLORIDE 5 MG/1
5 TABLET ORAL EVERY 4 HOURS PRN
Status: DISCONTINUED | OUTPATIENT
Start: 2024-04-09 | End: 2024-04-11 | Stop reason: HOSPADM

## 2024-04-09 RX ORDER — LIDOCAINE 40 MG/G
CREAM TOPICAL
Status: DISCONTINUED | OUTPATIENT
Start: 2024-04-09 | End: 2024-04-11 | Stop reason: HOSPADM

## 2024-04-09 RX ORDER — ACETAMINOPHEN 325 MG/1
975 TABLET ORAL 3 TIMES DAILY
Status: DISCONTINUED | OUTPATIENT
Start: 2024-04-09 | End: 2024-04-11 | Stop reason: HOSPADM

## 2024-04-09 RX ORDER — DEXAMETHASONE SODIUM PHOSPHATE 4 MG/ML
INJECTION, SOLUTION INTRA-ARTICULAR; INTRALESIONAL; INTRAMUSCULAR; INTRAVENOUS; SOFT TISSUE PRN
Status: DISCONTINUED | OUTPATIENT
Start: 2024-04-09 | End: 2024-04-09

## 2024-04-09 RX ORDER — NALOXONE HYDROCHLORIDE 0.4 MG/ML
0.1 INJECTION, SOLUTION INTRAMUSCULAR; INTRAVENOUS; SUBCUTANEOUS
Status: DISCONTINUED | OUTPATIENT
Start: 2024-04-09 | End: 2024-04-09 | Stop reason: HOSPADM

## 2024-04-09 RX ORDER — FENTANYL CITRATE 50 UG/ML
INJECTION, SOLUTION INTRAMUSCULAR; INTRAVENOUS PRN
Status: DISCONTINUED | OUTPATIENT
Start: 2024-04-09 | End: 2024-04-09

## 2024-04-09 RX ORDER — SODIUM CHLORIDE, SODIUM LACTATE, POTASSIUM CHLORIDE, CALCIUM CHLORIDE 600; 310; 30; 20 MG/100ML; MG/100ML; MG/100ML; MG/100ML
INJECTION, SOLUTION INTRAVENOUS CONTINUOUS PRN
Status: DISCONTINUED | OUTPATIENT
Start: 2024-04-09 | End: 2024-04-09

## 2024-04-09 RX ORDER — ONDANSETRON 2 MG/ML
4 INJECTION INTRAMUSCULAR; INTRAVENOUS EVERY 30 MIN PRN
Status: DISCONTINUED | OUTPATIENT
Start: 2024-04-09 | End: 2024-04-09 | Stop reason: HOSPADM

## 2024-04-09 RX ORDER — HYDROMORPHONE HCL IN WATER/PF 6 MG/30 ML
0.2 PATIENT CONTROLLED ANALGESIA SYRINGE INTRAVENOUS EVERY 5 MIN PRN
Status: DISCONTINUED | OUTPATIENT
Start: 2024-04-09 | End: 2024-04-09 | Stop reason: HOSPADM

## 2024-04-09 RX ORDER — HYDROMORPHONE HCL IN WATER/PF 6 MG/30 ML
0.2 PATIENT CONTROLLED ANALGESIA SYRINGE INTRAVENOUS
Status: DISCONTINUED | OUTPATIENT
Start: 2024-04-09 | End: 2024-04-11 | Stop reason: HOSPADM

## 2024-04-09 RX ORDER — GLYCOPYRROLATE 0.2 MG/ML
INJECTION, SOLUTION INTRAMUSCULAR; INTRAVENOUS PRN
Status: DISCONTINUED | OUTPATIENT
Start: 2024-04-09 | End: 2024-04-09

## 2024-04-09 RX ORDER — BUPIVACAINE HYDROCHLORIDE 2.5 MG/ML
INJECTION, SOLUTION INFILTRATION; PERINEURAL PRN
Status: DISCONTINUED | OUTPATIENT
Start: 2024-04-09 | End: 2024-04-09 | Stop reason: HOSPADM

## 2024-04-09 RX ORDER — HYDROMORPHONE HCL IN WATER/PF 6 MG/30 ML
0.1 PATIENT CONTROLLED ANALGESIA SYRINGE INTRAVENOUS
Status: DISCONTINUED | OUTPATIENT
Start: 2024-04-09 | End: 2024-04-11 | Stop reason: HOSPADM

## 2024-04-09 RX ORDER — ESMOLOL HYDROCHLORIDE 10 MG/ML
INJECTION INTRAVENOUS PRN
Status: DISCONTINUED | OUTPATIENT
Start: 2024-04-09 | End: 2024-04-09

## 2024-04-09 RX ORDER — DIPHENHYDRAMINE HYDROCHLORIDE 50 MG/ML
12.5 INJECTION INTRAMUSCULAR; INTRAVENOUS EVERY 6 HOURS PRN
Status: DISCONTINUED | OUTPATIENT
Start: 2024-04-09 | End: 2024-04-09 | Stop reason: HOSPADM

## 2024-04-09 RX ADMIN — LIDOCAINE HYDROCHLORIDE 100 MG: 20 INJECTION, SOLUTION INFILTRATION; PERINEURAL at 13:52

## 2024-04-09 RX ADMIN — SODIUM CHLORIDE, POTASSIUM CHLORIDE, SODIUM LACTATE AND CALCIUM CHLORIDE: 600; 310; 30; 20 INJECTION, SOLUTION INTRAVENOUS at 13:45

## 2024-04-09 RX ADMIN — PROPOFOL 40 MCG/KG/MIN: 10 INJECTION, EMULSION INTRAVENOUS at 14:34

## 2024-04-09 RX ADMIN — HYDROMORPHONE HYDROCHLORIDE 0.2 MG: 0.2 INJECTION, SOLUTION INTRAMUSCULAR; INTRAVENOUS; SUBCUTANEOUS at 18:48

## 2024-04-09 RX ADMIN — HYDROMORPHONE HYDROCHLORIDE 0.2 MG: 0.2 INJECTION, SOLUTION INTRAMUSCULAR; INTRAVENOUS; SUBCUTANEOUS at 19:39

## 2024-04-09 RX ADMIN — FENTANYL CITRATE 25 MCG: 50 INJECTION, SOLUTION INTRAMUSCULAR; INTRAVENOUS at 18:15

## 2024-04-09 RX ADMIN — Medication 20 MG: at 15:07

## 2024-04-09 RX ADMIN — GLYCOPYRROLATE 0.2 MG: 0.2 INJECTION, SOLUTION INTRAMUSCULAR; INTRAVENOUS at 14:44

## 2024-04-09 RX ADMIN — ESMOLOL HYDROCHLORIDE 40 MG: 10 INJECTION, SOLUTION INTRAVENOUS at 13:57

## 2024-04-09 RX ADMIN — ONDANSETRON 4 MG: 2 INJECTION INTRAMUSCULAR; INTRAVENOUS at 17:04

## 2024-04-09 RX ADMIN — FOSAPREPITANT 150 MG: 150 INJECTION, POWDER, LYOPHILIZED, FOR SOLUTION INTRAVENOUS at 14:32

## 2024-04-09 RX ADMIN — OXYCODONE HYDROCHLORIDE 5 MG: 5 TABLET ORAL at 20:35

## 2024-04-09 RX ADMIN — Medication 10 MG: at 16:34

## 2024-04-09 RX ADMIN — HYDROMORPHONE HYDROCHLORIDE 0.2 MG: 0.2 INJECTION, SOLUTION INTRAMUSCULAR; INTRAVENOUS; SUBCUTANEOUS at 22:14

## 2024-04-09 RX ADMIN — SODIUM CHLORIDE: 9 INJECTION, SOLUTION INTRAVENOUS at 18:07

## 2024-04-09 RX ADMIN — FENTANYL CITRATE 25 MCG: 50 INJECTION INTRAMUSCULAR; INTRAVENOUS at 16:24

## 2024-04-09 RX ADMIN — Medication 30 MG: at 14:35

## 2024-04-09 RX ADMIN — HYDROMORPHONE HYDROCHLORIDE 0.2 MG: 0.2 INJECTION, SOLUTION INTRAMUSCULAR; INTRAVENOUS; SUBCUTANEOUS at 19:00

## 2024-04-09 RX ADMIN — Medication 30 MG: at 13:52

## 2024-04-09 RX ADMIN — Medication 2 G: at 14:11

## 2024-04-09 RX ADMIN — SUGAMMADEX 200 MG: 100 INJECTION, SOLUTION INTRAVENOUS at 17:34

## 2024-04-09 RX ADMIN — HYDROMORPHONE HYDROCHLORIDE 0.2 MG: 0.2 INJECTION, SOLUTION INTRAMUSCULAR; INTRAVENOUS; SUBCUTANEOUS at 18:39

## 2024-04-09 RX ADMIN — HYDROMORPHONE HYDROCHLORIDE 0.2 MG: 0.2 INJECTION, SOLUTION INTRAMUSCULAR; INTRAVENOUS; SUBCUTANEOUS at 19:57

## 2024-04-09 RX ADMIN — HYDROMORPHONE HYDROCHLORIDE 0.4 MG: 0.2 INJECTION, SOLUTION INTRAMUSCULAR; INTRAVENOUS; SUBCUTANEOUS at 20:37

## 2024-04-09 RX ADMIN — Medication 10 MG: at 16:24

## 2024-04-09 RX ADMIN — LABETALOL 20 MG/4 ML (5 MG/ML) INTRAVENOUS SYRINGE 5 MG: at 14:47

## 2024-04-09 RX ADMIN — FENTANYL CITRATE 50 MCG: 50 INJECTION INTRAMUSCULAR; INTRAVENOUS at 14:44

## 2024-04-09 RX ADMIN — FENTANYL CITRATE 25 MCG: 50 INJECTION, SOLUTION INTRAMUSCULAR; INTRAVENOUS at 18:25

## 2024-04-09 RX ADMIN — SODIUM CHLORIDE, SODIUM GLUCONATE, SODIUM ACETATE, POTASSIUM CHLORIDE AND MAGNESIUM CHLORIDE: 526; 502; 368; 37; 30 INJECTION, SOLUTION INTRAVENOUS at 14:06

## 2024-04-09 RX ADMIN — ACETAMINOPHEN 975 MG: 325 TABLET, FILM COATED ORAL at 13:19

## 2024-04-09 RX ADMIN — FENTANYL CITRATE 50 MCG: 50 INJECTION INTRAMUSCULAR; INTRAVENOUS at 14:38

## 2024-04-09 RX ADMIN — HYDROMORPHONE HYDROCHLORIDE 0.5 MG: 1 INJECTION, SOLUTION INTRAMUSCULAR; INTRAVENOUS; SUBCUTANEOUS at 16:59

## 2024-04-09 RX ADMIN — PROPOFOL 80 MG: 10 INJECTION, EMULSION INTRAVENOUS at 13:52

## 2024-04-09 RX ADMIN — DEXAMETHASONE SODIUM PHOSPHATE 8 MG: 4 INJECTION, SOLUTION INTRA-ARTICULAR; INTRALESIONAL; INTRAMUSCULAR; INTRAVENOUS; SOFT TISSUE at 13:55

## 2024-04-09 RX ADMIN — PROPOFOL 20 MG: 10 INJECTION, EMULSION INTRAVENOUS at 13:56

## 2024-04-09 RX ADMIN — Medication 20 MG: at 15:56

## 2024-04-09 ASSESSMENT — ACTIVITIES OF DAILY LIVING (ADL)
ADLS_ACUITY_SCORE: 22
ADLS_ACUITY_SCORE: 22
ADLS_ACUITY_SCORE: 20
ADLS_ACUITY_SCORE: 22

## 2024-04-09 ASSESSMENT — COPD QUESTIONNAIRES: COPD: 0

## 2024-04-09 ASSESSMENT — ENCOUNTER SYMPTOMS: ORTHOPNEA: 0

## 2024-04-09 NOTE — OP NOTE
DATE OF SURGERY: 04/09/24  PREOPERATIVE DIAGNOSIS:  Left renal mass.   POSTOPERATIVE DIAGNOSIS: Left renal mass.   PROCEDURE PERFORMED:   1) Left robotic-assisted laparoscopic partial nephrectomy;   2) Laparoscopic ultrasound of renal mass with intraoperative interpretation of imaging   STAFF SURGEON: Valerie Covington MD, present and scrubbed for all critical portions of the procedure, including the entire radiographic portion.  RESIDENT SURGEON: Judi Mancilla MD, Hugo Ortega MD, Giuseppe Ortega MD  ANESTHESIA: General.   ESTIMATED BLOOD LOSS: 150 mL.   DRAINS AND TUBES: Choi catheter; SHANTELL drain.  COMPLICATIONS: None.   DISPOSITION: PACU.   SPECIMENS OBTAINED: Left renal mass  SIGNIFICANT FINDINGS: Tumor was resected with visually negative margins.    HISTORY OF PRESENT ILLNESS: 69M with a history of a left-sided upper pole renal mass. After a discussion of all risks, benefits, and alternatives, the patient elected to undergo definitive management with a robotic partial nephrectomy.  OPERATION PERFORMED:   Informed consent was obtained and the patient was brought to the operating room where general anesthesia was induced. She was given appropriate preoperative antibiotics and positioned in the full flank position where all pressure points were carefully padded and secured. She was then prepped and draped in the usual sterile fashion. We then performed a timeout, verifying the correct patient's site and procedure to be performed.    Incision was made superior and lateral to the umbilicus a the lateral edge of the rectus. After confirmatory drop test, the Veress needle was used for insufflation. We placed a 12 mm assistant port and four 8 mm robotic ports. The robot was docked. The colon was reflected medially to expose the anterior surface of Gerota's fascia. The medial aspect of the kidney was exposed and the ureter and gonadal vein were identified. Kidney was lifted off the psoas muscle and dissection was carried  posteriorly until the renal artery and vein were identified. The hilar vessels were carefully skeletonized. Once this was exposed, Gerota's was opened and the kidney was completely mobilized. The mass location was noted upper pole and was difficult to get to, therefore the entire kidney required mobilization and rotation medially. The ultrasound probe was then used to verify location of mass and outline the borders for dissection.  Artery was then clamped and excised the tumor using sharp dissection. Once the tumor was free, the base of the resection bed using was oversewn using 3-0 V-lock suture x2. At this point we unclamped the hilum; total clamp time was 21 minutes. The defect in the parenchyma was then closed using the interrupred 0-Vicryl sutures for capsular closure with sliding weck technique. A second weck placed for security on each stitch. These were placed over Surgicel and Floseal. Hemostasis appeared excellent.   Specimen was placed in a 10 mm EndoCatch bag.  SHANTELL drain was inserted. Ports were removed. The specimen was removed and sent to pathology. We closed the fascia from the extraction site with 0-Vicryl in a running fashion. Skin was re approximated using 4-0 Monocryl. The wounds were dressed with skin glue. The patient was then awakened and taken to the PACU in stable condition.  Judi Mancilla MD   PGY-5 Urology Resident

## 2024-04-09 NOTE — ANESTHESIA CARE TRANSFER NOTE
Patient: Yolanda Edwards    Procedure: Procedure(s):  Robot assisted Laparoscopic Partial NEPHRECTOMY       Diagnosis: Renal mass [N28.89]  Diagnosis Additional Information: No value filed.    Anesthesia Type:   General     Note:    Oropharynx: spontaneously breathing and oral airway in place  Level of Consciousness: drowsy  Oxygen Supplementation: face mask  Level of Supplemental Oxygen (L/min / FiO2): 4  Independent Airway: airway patency satisfactory and stable  Dentition: dentition unchanged  Vital Signs Stable: post-procedure vital signs reviewed and stable  Report to RN Given: handoff report given  Patient transferred to: PACU  Comments: VSS, report given to RN.    Handoff Report: Identifed the Patient, Identified the Reponsible Provider, Reviewed the pertinent medical history, Discussed the surgical course, Reviewed Intra-OP anesthesia mangement and issues during anesthesia, Set expectations for post-procedure period and Allowed opportunity for questions and acknowledgement of understanding      Vitals:  Vitals Value Taken Time   /62 04/09/24 1748   Temp     Pulse 67 04/09/24 1752   Resp 15 04/09/24 1752   SpO2 100 % 04/09/24 1752   Vitals shown include unfiled device data.    Electronically Signed By: SHALA Flood CRNA  April 9, 2024  5:53 PM

## 2024-04-09 NOTE — ANESTHESIA PROCEDURE NOTES
Airway       Patient location during procedure: OR       Procedure Start/Stop Times: 4/9/2024 1:58 PM  Staff -        Anesthesiologist:  Tiffany Donovan MD       Resident/Fellow: Jacquelin Kay MD       Performed By: resident  Consent for Airway        Urgency: elective  Indications and Patient Condition       Indications for airway management: kain-procedural       Induction type:intravenous       Mask difficulty assessment: 2 - vent by mask + OA or adjuvant +/- NMBA    Final Airway Details       Final airway type: endotracheal airway       Successful airway: ETT - single and Oral  Endotracheal Airway Details        ETT size (mm): 6.5       Cuffed: yes       Successful intubation technique: video laryngoscopy       VL Blade Size: Glidescope 3       Grade View of Cords: 1       Adjucts: stylet       Position: Right       Measured from: lips       Secured at (cm): 21       Bite block used: None    Post intubation assessment        Placement verified by: capnometry, equal breath sounds and chest rise        Number of attempts at approach: 1       Secured with: tape       Ease of procedure: easy       Dentition: Intact and Lips/oral mucosa injury    Medication(s) Administered   Medication Administration Time: 4/9/2024 1:58 PM    Additional Comments       Small lip abrasion on right upper lip treated with lubricating jelly and phenylephrine

## 2024-04-10 LAB
ANION GAP SERPL CALCULATED.3IONS-SCNC: 13 MMOL/L (ref 7–15)
BUN SERPL-MCNC: 15.4 MG/DL (ref 8–23)
CALCIUM SERPL-MCNC: 8.5 MG/DL (ref 8.8–10.2)
CHLORIDE SERPL-SCNC: 101 MMOL/L (ref 98–107)
CREAT SERPL-MCNC: 0.97 MG/DL (ref 0.51–0.95)
DEPRECATED HCO3 PLAS-SCNC: 21 MMOL/L (ref 22–29)
EGFRCR SERPLBLD CKD-EPI 2021: 63 ML/MIN/1.73M2
ERYTHROCYTE [DISTWIDTH] IN BLOOD BY AUTOMATED COUNT: 12.6 % (ref 10–15)
GLUCOSE BLDC GLUCOMTR-MCNC: 142 MG/DL (ref 70–99)
GLUCOSE SERPL-MCNC: 139 MG/DL (ref 70–99)
HCT VFR BLD AUTO: 32.4 % (ref 35–47)
HGB BLD-MCNC: 10.7 G/DL (ref 11.7–15.7)
MCH RBC QN AUTO: 31.1 PG (ref 26.5–33)
MCHC RBC AUTO-ENTMCNC: 33 G/DL (ref 31.5–36.5)
MCV RBC AUTO: 94 FL (ref 78–100)
PLATELET # BLD AUTO: 209 10E3/UL (ref 150–450)
POTASSIUM SERPL-SCNC: 4.4 MMOL/L (ref 3.4–5.3)
RBC # BLD AUTO: 3.44 10E6/UL (ref 3.8–5.2)
SODIUM SERPL-SCNC: 135 MMOL/L (ref 135–145)
WBC # BLD AUTO: 14.8 10E3/UL (ref 4–11)

## 2024-04-10 PROCEDURE — 258N000003 HC RX IP 258 OP 636: Performed by: STUDENT IN AN ORGANIZED HEALTH CARE EDUCATION/TRAINING PROGRAM

## 2024-04-10 PROCEDURE — 250N000013 HC RX MED GY IP 250 OP 250 PS 637

## 2024-04-10 PROCEDURE — 250N000011 HC RX IP 250 OP 636: Performed by: STUDENT IN AN ORGANIZED HEALTH CARE EDUCATION/TRAINING PROGRAM

## 2024-04-10 PROCEDURE — 250N000013 HC RX MED GY IP 250 OP 250 PS 637: Performed by: UROLOGY

## 2024-04-10 PROCEDURE — 85027 COMPLETE CBC AUTOMATED: CPT | Performed by: STUDENT IN AN ORGANIZED HEALTH CARE EDUCATION/TRAINING PROGRAM

## 2024-04-10 PROCEDURE — 250N000013 HC RX MED GY IP 250 OP 250 PS 637: Performed by: STUDENT IN AN ORGANIZED HEALTH CARE EDUCATION/TRAINING PROGRAM

## 2024-04-10 PROCEDURE — 80048 BASIC METABOLIC PNL TOTAL CA: CPT | Performed by: STUDENT IN AN ORGANIZED HEALTH CARE EDUCATION/TRAINING PROGRAM

## 2024-04-10 PROCEDURE — 93005 ELECTROCARDIOGRAM TRACING: CPT

## 2024-04-10 PROCEDURE — 36415 COLL VENOUS BLD VENIPUNCTURE: CPT | Performed by: STUDENT IN AN ORGANIZED HEALTH CARE EDUCATION/TRAINING PROGRAM

## 2024-04-10 RX ORDER — HYDROCODONE BITARTRATE AND ACETAMINOPHEN 5; 325 MG/1; MG/1
1 TABLET ORAL EVERY 12 HOURS PRN
Status: ON HOLD | COMMUNITY
End: 2024-04-11

## 2024-04-10 RX ORDER — METHOCARBAMOL 500 MG/1
500 TABLET, FILM COATED ORAL 4 TIMES DAILY PRN
Qty: 28 TABLET | Refills: 0 | Status: SHIPPED | OUTPATIENT
Start: 2024-04-10

## 2024-04-10 RX ORDER — METHOCARBAMOL 500 MG/1
500 TABLET, FILM COATED ORAL 4 TIMES DAILY PRN
Status: DISCONTINUED | OUTPATIENT
Start: 2024-04-10 | End: 2024-04-11 | Stop reason: HOSPADM

## 2024-04-10 RX ORDER — ACETAMINOPHEN 325 MG/1
650 TABLET ORAL EVERY 4 HOURS PRN
Qty: 100 TABLET | Refills: 0 | Status: SHIPPED | OUTPATIENT
Start: 2024-04-10

## 2024-04-10 RX ORDER — CALCIUM CARBONATE 500 MG/1
500 TABLET, CHEWABLE ORAL DAILY PRN
Status: DISCONTINUED | OUTPATIENT
Start: 2024-04-10 | End: 2024-04-11 | Stop reason: HOSPADM

## 2024-04-10 RX ORDER — OXYCODONE HYDROCHLORIDE 5 MG/1
2.5 TABLET ORAL EVERY 6 HOURS PRN
Qty: 10 TABLET | Refills: 0 | Status: SHIPPED | OUTPATIENT
Start: 2024-04-10

## 2024-04-10 RX ORDER — AMOXICILLIN 250 MG
1-2 CAPSULE ORAL 2 TIMES DAILY
Qty: 45 TABLET | Refills: 0 | Status: SHIPPED | OUTPATIENT
Start: 2024-04-10

## 2024-04-10 RX ADMIN — ONDANSETRON 4 MG: 4 TABLET, ORALLY DISINTEGRATING ORAL at 09:25

## 2024-04-10 RX ADMIN — METHOCARBAMOL 500 MG: 500 TABLET ORAL at 21:14

## 2024-04-10 RX ADMIN — ACETAMINOPHEN 975 MG: 325 TABLET, FILM COATED ORAL at 00:22

## 2024-04-10 RX ADMIN — DOCUSATE SODIUM 50 MG AND SENNOSIDES 8.6 MG 1 TABLET: 8.6; 5 TABLET, FILM COATED ORAL at 07:59

## 2024-04-10 RX ADMIN — ACETAMINOPHEN 975 MG: 325 TABLET, FILM COATED ORAL at 13:33

## 2024-04-10 RX ADMIN — HYDROMORPHONE HYDROCHLORIDE 0.2 MG: 0.2 INJECTION, SOLUTION INTRAMUSCULAR; INTRAVENOUS; SUBCUTANEOUS at 22:19

## 2024-04-10 RX ADMIN — CALCIUM CARBONATE (ANTACID) CHEW TAB 500 MG 500 MG: 500 CHEW TAB at 21:30

## 2024-04-10 RX ADMIN — OXYCODONE HYDROCHLORIDE 5 MG: 5 TABLET ORAL at 07:59

## 2024-04-10 RX ADMIN — OXYCODONE HYDROCHLORIDE 5 MG: 5 TABLET ORAL at 04:13

## 2024-04-10 RX ADMIN — ACETAMINOPHEN 975 MG: 325 TABLET, FILM COATED ORAL at 21:14

## 2024-04-10 RX ADMIN — DOCUSATE SODIUM 50 MG AND SENNOSIDES 8.6 MG 1 TABLET: 8.6; 5 TABLET, FILM COATED ORAL at 21:14

## 2024-04-10 RX ADMIN — OXYCODONE HYDROCHLORIDE 5 MG: 5 TABLET ORAL at 13:34

## 2024-04-10 RX ADMIN — OXYCODONE HYDROCHLORIDE 5 MG: 5 TABLET ORAL at 00:25

## 2024-04-10 RX ADMIN — DOCUSATE SODIUM 50 MG AND SENNOSIDES 8.6 MG 1 TABLET: 8.6; 5 TABLET, FILM COATED ORAL at 00:22

## 2024-04-10 RX ADMIN — ACETAMINOPHEN 975 MG: 325 TABLET, FILM COATED ORAL at 07:59

## 2024-04-10 RX ADMIN — SODIUM CHLORIDE: 9 INJECTION, SOLUTION INTRAVENOUS at 06:19

## 2024-04-10 RX ADMIN — SODIUM CHLORIDE: 9 INJECTION, SOLUTION INTRAVENOUS at 11:42

## 2024-04-10 RX ADMIN — OXYCODONE HYDROCHLORIDE 5 MG: 5 TABLET ORAL at 18:57

## 2024-04-10 RX ADMIN — SODIUM CHLORIDE: 9 INJECTION, SOLUTION INTRAVENOUS at 21:14

## 2024-04-10 RX ADMIN — METHOCARBAMOL 500 MG: 500 TABLET ORAL at 11:24

## 2024-04-10 ASSESSMENT — ACTIVITIES OF DAILY LIVING (ADL)
ADLS_ACUITY_SCORE: 23
ADLS_ACUITY_SCORE: 22
ADLS_ACUITY_SCORE: 23
ADLS_ACUITY_SCORE: 22
ADLS_ACUITY_SCORE: 27
ADLS_ACUITY_SCORE: 23
ADLS_ACUITY_SCORE: 22
ADLS_ACUITY_SCORE: 23
ADLS_ACUITY_SCORE: 22
ADLS_ACUITY_SCORE: 23
ADLS_ACUITY_SCORE: 22
ADLS_ACUITY_SCORE: 23
ADLS_ACUITY_SCORE: 27
ADLS_ACUITY_SCORE: 23
ADLS_ACUITY_SCORE: 22
ADLS_ACUITY_SCORE: 23
ADLS_ACUITY_SCORE: 22

## 2024-04-10 NOTE — PLAN OF CARE
Goal Outcome Evaluation:    A/Ox4. On room air. Denies SOB, chest pain & N/V. Urethral catheter 200 cc clear/yellow output; SHANTELL LLQ draining serosanguinous. Pain controlled with PRN Oxy and scheduled Tylenol. No BM this shift. Regular diet. Continue POC.       Vitals:  /69   Pulse 82   Temp 97.9  F (36.6  C)   Resp 12   Ht 1.524 m (5')   Wt 46 kg (101 lb 6.6 oz)   SpO2 98%   BMI 19.81 kg/m

## 2024-04-10 NOTE — DISCHARGE SUMMARY
"[facility]    Patient: Yolanda Edwards    MRN: [id number]   : 1955         Date of Admission:  2024   Date of Discharge::  2024  Admitting Physician:  Valerie Covington MD  Discharge Physician:  yoli Jose PA             Admission Diagnoses:   # Left \"unclassified low-grade oncocytic renal neoplasms x 2\", with path showing positive margins  # Acute blood loss anemia, expected secondary to surgery, not requiring transfusion    Past Medical History:   Diagnosis Date    History of basal cell carcinoma     Ischemic colitis (H24)     Osteoarthritis     Renal mass              Discharge Diagnosis:     # Left \"unclassified low-grade oncocytic renal neoplasms x 2\", with path showing positive margins  # Acute blood loss anemia, expected secondary to surgery, not requiring transfusion    Past Medical History:   Diagnosis Date    History of basal cell carcinoma     Ischemic colitis (H24)     Osteoarthritis     Renal mass             Procedures:     Procedure(s): 24 -   PROCEDURE PERFORMED:   1) Left robotic-assisted laparoscopic partial nephrectomy;   2) Laparoscopic ultrasound of renal mass with intraoperative interpretation of imaging   Dr. Valerie Covington (Urology)            Medications Prior to Admission:     Medications Prior to Admission   Medication Sig Dispense Refill Last Dose    acyclovir (ZOVIRAX) 800 MG tablet Take 800 mg by mouth as needed   Past Month    Collagen-Vitamin C-Biotin (COLLAGEN 1500/C PO) 1 tablet by Other route every evening       diphenhydrAMINE-acetaminophen (SM PAIN RELIEVER PM)  MG tablet Take 1 tablet by mouth at bedtime       glucosamine-chondroitin 500-400 MG CAPS per capsule Take 1 capsule by mouth every evening       HYDROcodone-acetaminophen (NORCO) 5-325 MG tablet Take 1 tablet by mouth every 4 hours as needed                Discharge Medications:     Current Discharge Medication List        CONTINUE these medications which have " "NOT CHANGED    Details   acyclovir (ZOVIRAX) 800 MG tablet Take 800 mg by mouth as needed      Collagen-Vitamin C-Biotin (COLLAGEN 1500/C PO) 1 tablet by Other route every evening      diphenhydrAMINE-acetaminophen (SM PAIN RELIEVER PM)  MG tablet Take 1 tablet by mouth at bedtime      glucosamine-chondroitin 500-400 MG CAPS per capsule Take 1 capsule by mouth every evening      HYDROcodone-acetaminophen (NORCO) 5-325 MG tablet Take 1 tablet by mouth every 4 hours as needed                   Consultations:   Consultation during this admission received:   None          Brief History of Illness:   Reason for admission requiring a surgical or invasive procedure:   Renal mass [N28.89]   The patient underwent the following procedure(s):   See above   There were no immediate complications during this procedure.    Please refer to the full operative summary for details.           Hospital Course:   The patient's hospital course was remarkable for:    # Acute blood loss anemia, expected secondary to surgery, not requiring transfusion.    Preop hemoglobin was 12.9g/dL (3/20/24).  Following surgery on POD#1, levels dropped to 10.7g/dL.  Hemoglobin was stable on POD#2 (11.1g/dL).      Yolanda Edwards otherwise recovered as anticipated and experienced no post-operative complications.    Prior to discharge on POD#2 she was ambulating without assitance, tolerating the discharge diet, had pain controlled with PO medications to go home with, and was requiring no IV medications or fluids.  Prior to discharge her abdominal drain was removed given low outputs.  She was discharged to home with appropriate contact information, follow-up and instructions as seen below in the discharge paperwork.         Discharge Labs:     No results found for: \"PSA\"  No lab results found in last 7 days.    Recent Labs   Lab 04/10/24  0703 04/09/24  1221   * 81     No lab results found in last 7 days.    Invalid input(s): \"URINEBLOOD\"  No " results found for this or any previous visit.         Discharge Instructions and Follow-Up:   Diet:  - Regular/ home diet    Activity:   - No strenuous exercise for 4 weeks.   - No lifting, pushing, pulling more than 15 pounds for 4 weeks.   - Do not strain with bowel movements.   - Do not drive until you can press the brake pedal quickly and fully without pain.   - Do not operate a motor vehicle while taking narcotic pain medications.     Medications:   1) PAIN: To reduce your Opiate use, multiple non-opiate medications can be taken together.  First take Tylenol (acetaminophen/APAP).  Then add Robaxen (methocarbamol) for abdominal.  In addition to these, medications such as ibuprofen can also be added to help with pain.  Some of these have been prescribed for you.  Always follow prescribing guidelines.  Do not take more than 3,200 - 4,000mg of Tylenol (acetaminophen/ APAP) from all sources in any 24 hour period since this can cause liver damage.  Do not take more than 2000 - 2400mg of ibuprofen in any 24 hour period since this can cause kidney damage.     If you still have pain after using non-opiates, add Oxycodone, an Opiate medication that has been prescribed for pain.  Never drive, operate machinery or drink alcoholic beverages while you are taking Opiate pain medications. Opiates will cause sleepiness and constipation and can become addictive, therefore it is best to stop or reduce them as soon as you can.  Any left over Opiates should be disposed of with an Authorized  for unneeded medications.  Contact your [facility]'s or [facility]'s household trash and recycling service to learn about medication disposal options and guidelines for your area.  If you decide to store this medication at home it should be kept in a locked cabinet to prevent access to children or visitors.     2) CONSTIPATION: Surgery, pain medications and bladder spasm medications can all make you constipated.  Pericolace  (senna/docusate sodium) can be taken twice daily for prevention and treatment of constipation.  Other over the counter solutions such as prune juice, miralax, fiber products, senna, and dulcolax can also be used if you prefer.  Please reduce or stop these if you develop loose stools. If you are taking these but still have not had a bowel movement in 3 days, start over-the-counter Milk of Magnesia taken twice daily until you have a good result.  Call the office with any concerns.     Tubes/Drains:  Abdominal Drain:  - Your abdominal drain has been removed.  While the site is healing, change dressings once per day or more frequently if soiled or wet, to keep the site clean.  Once the site has healed, remove dressings completely to leave the site uncovered.      Incisions:   - You may shower and get incisions wet starting 48 hrs after surgery.  - Do not scrub incisions or submerge wounds in a bath, pool, hot tub, etc. for 2 weeks.   - Your incisions were closed with dissolvable suture that will not need to be removed.  Commonly, purple dermabond glue is applied over the top of the sutures.  Avoid using lotions or ointments on your incisions.    - Leave incisions open to air.  Cover with gauze only if needed for comfort or to protect clothing from drainage.     Follow-Up:  - Schedule an appointment to be seen by your primary care provider within 7-10 days of discharge for a postoperative checkup  - Follow up with Dr. Covington in the next couple weeks to review pathology and for a postop checkup.  Urology will be calling you to schedule this appt.   - Call or return sooner than your regularly scheduled visit if you develop any of the following:  Fever (greater than 101.3F) or flu-like symptoms, uncontrolled pain, uncontrolled nausea or vomiting, as well as increased redness, swelling, or drainage from your wound or any difficulties passing urine.   - Drink 2-3L of water a day to keep your urine clear to light pink in  "color. Some light blood in your urine can occur but should clear with increased water consumption as instructed.    - Call if blood in urine persists, becomes darker, you see clots or have difficulty urinating    Phone numbers:   - Monday through Friday 8am to 4:30pm: Call [phone] with questions, requests for medication refills, or to schedule or confirm an appointment.  - Nights, weekends, or holidays: call the after hours emergency pager - [phone] and tell the  \"I would like to page the Urology Resident on call.\" Typically, the on-call provider should return your call within 30 minutes.  Please page the on-call provider again if you haven't been contacted as expected.  Rarely, the on-call provider will be unable to promptly return a call due to a hospital emergency.  If you have paged twice and are still not contacted, ask the hospital  to page the \"urology CHIEF-RESIDENT on call\".   - Please note that due to prescribing laws, resident physicians are unable to prescribe narcotics after-hours. If you feel as though you will need a refill of a narcotic pain medication, you will need to call the clinic during business hours OR seek emergency care.  - For emergencies, call 911         Discharge Disposition:     Discharged to home      Attestation: I have reviewed today's vital signs, notes, medications, labs and imaging.    Veronica Jose PA-C  Urology Physician Assistant  Personal Pager: [phone]    Please call Job Code:   [phone] to reach the Urology resident or PA on call - Weekdays  [phone] to reach the Urology resident or PA on call - Weeknights and weekends    April 11, 2024         "

## 2024-04-10 NOTE — OR NURSING
Dr Lozano assessed pt.  Ordered to give PO Oxycodone and 0.4mg IV Dilaudid.  Will continue to monitor pt closely.

## 2024-04-10 NOTE — PLAN OF CARE
Goal Outcome Evaluation:      Plan of Care Reviewed With: patient, child, spouse    Overall Patient Progress: no change    Vital signs: /57   Pulse 68   Temp 97.6  F (36.4  C) (Oral)   Resp 16   Ht 1.524 m (5')   Wt 46 kg (101 lb 6.6 oz)   SpO2 99%   BMI 19.81 kg/m      Status: POD 1- Left robotic-assisted laparoscopic partial nephrectomy  Neuro:  A&Ox4  Pain/Nausea: complain 1x of nausea rec'd PRN zofran. Rec'd 3x dose of oxy for c/o pain 5-8/10 this shift. Rec'd 1x dose of robaxin for c/o muscle aches. Most currently c/o 8/10 abdominal pain.   Cardiac: WDL   Respiratory: WDL  Mobility: Independent  Diet: Regular  LDAs: L side J-P drain. R PIV infusing continuous Nacl @ 75ml an hour.   Skin/incisions: J-P drain- draining serosanguinous output. Dressing saturated & changed 2x this shift. 4x lap sites WDL.  GI/: Gale removed at 1050 this morning. Pt has voided 175cc total of yellow urine since gale removal. Bladder scan completed 4x this shift. 0ml residual urine found each time. Provider notified.  Plan: Continue with POC.

## 2024-04-10 NOTE — PROGRESS NOTES
Urology  Progress Note    NAEO  Pain well controlled  Tolerating diet  Not yet Ambulating    Exam  /69   Pulse 82   Temp 97.9  F (36.6  C)   Resp 12   Ht 1.524 m (5')   Wt 46 kg (101 lb 6.6 oz)   SpO2 98%   BMI 19.81 kg/m    No acute distress  Unlabored breathing  Abdomen soft,  appropriately tender, nondistended. Incisions CDI  SHANTELL serosanguinous  Choi with clear urine in tubing      /--  SHANTELL 10/10    Labs  Recent Labs   Lab Test 03/20/24  1055   WBC 7.7   HGB 12.9   CR 0.86         AM labs pending    Assessment/Plan  69 year old female with PMH of cervical fusions 92, 93 with some persistent pain , 1 Day Post-Op s/p left robotic partial nephrectomy.     Neuro: Tylenol, prn oxy/dilaudid for pain control  CV: JOSEMANUEL  Pulm: IS while awake  FEN/GI: ADAT, wean mIVF as tolerate. Bowel regimen.  Endo: Euglycemia  : TOV today, drain to come out today  Heme/ID: Hgb stable  Activity: Up ad dorie  PPx: SCDs.   Dispo: Likely discharge today    Seen and examined with the chief resident. Will discuss with Dr. Covington.    Byron Marvin MD  Urology Resident     Contacting the Urology Team     Please use the following job codes to reach the Urology Team. Note that you must use an in house phone and that job codes cannot receive text pages.   On weekdays, dial [phone] (or star-star-star [phone] on the new [employer] telephones) then [phone] to reach the Adult Urology resident or PA on call  On weekdays, dial [phone] (or star-star-star [phone] on the new [employer] telephones) then [phone] to reach the Pediatric Urology resident  On weeknights and weekends, dial [phone] (or star-star-star [phone] on the new [employer] telephones) then [phone] to reach the Urology resident on call (for both Adult and Pediatrics)

## 2024-04-10 NOTE — PHARMACY-ADMISSION MEDICATION HISTORY
Pharmacist Admission Medication History    Admission medication history is complete. The information provided in this note is only as accurate as the sources available at the time of the update.    Information Source(s): Patient and CareEverywhere/SureScripts via in-person    Pertinent Information: Patient was a good historian of their medications confirming name, strength, frequency, and last doses for each medication.    Changes made to PTA medication list:  Added: None  Deleted: None  Changed:   Diphenhydramine-acetaminophen  mg tablet - take 1 tablet by mouth nightly as needed --> take 2 tablet by mouth nightly as needed (per patient)  Hydrocodone acetaminophen 5-325 mg tablet - take 1 tablet by mouth every 4 hours as needed for severe pain --> take 1 tablet by mouth every 12 hours as needed for severe pain (per patient, PDMP)    Allergies reviewed with patient and updates made in EHR: yes    Medication History Completed By: Florian Ponce RPH 4/10/2024 8:22 AM    PTA Med List   Medication Sig Last Dose    acyclovir (ZOVIRAX) 800 MG tablet Take 800 mg by mouth 5 times daily as needed for shingles flares Past Month    Collagen-Vitamin C-Biotin (COLLAGEN 1500/C PO) 1 tablet by Other route every evening Past Week    diphenhydrAMINE-acetaminophen (SM PAIN RELIEVER PM)  MG tablet Take 2 tablets by mouth nightly as needed Past Week    glucosamine-chondroitin 500-400 MG CAPS per capsule Take 1 capsule by mouth every evening Past Week    HYDROcodone-acetaminophen (NORCO) 5-325 MG tablet Take 1 tablet by mouth every 12 hours as needed for severe pain 4/8/2024

## 2024-04-10 NOTE — OR NURSING
Patient c/o moderate to severe pain in L abdominal region.  1.0mg IV Dilaudid and 50mcg Fentanyl provided in PACU at this writing.  Dr Lozano notified and stated he would assess pt.

## 2024-04-10 NOTE — UTILIZATION REVIEW
Admission Status; Secondary Review Determination       Under the authority of the Utilization Management Committee, the utilization review process indicated a secondary review on the above patient. The review outcome is based on review of the medical records, discussions with staff, and applying clinical experience noted on the date of the review.     (x) Outpatient Status with extended recovery is appropriate - This patient does not meet hospital inpatient criteria. If this patient's primary payer is Medicare and was admitted as an inpatient, Condition Code 44 should be used and patient status changed to outpatient recovery.    RATIONALE FOR DETERMINATION      Ms. Edwards is a years old woman who underwent an elective left robotic partial nephrectomy . Patient was admitted to the hospital after the procedure. Patient has Medicare and the procedure is not on the CMS inpatient list. No documented complications or unexpected recovery. Patient can be safely  monitored for bleeding and recover in outpatient/extended recovery setting. It is anticipated that she will discharge home later today.      I have sent message to Dr. Marvin regarding the above determination.    The severity of illness, intensity of service provided, expected LOS and risk for adverse outcome doesn't meet inpatient hospital admission.     The information on this document is developed by the utilization review team in order for the business office to ensure compliance. This only denotes the appropriateness of proper admission status and does not reflect the quality of care rendered.   The definitions of Inpatient Status and Observation Status used in making the determination above are those provided in the CMS Coverage Manual, Chapter 1 and Chapter 6, section 70.4.       Sincerely,       Judi Ellington, DO  Utilization Review  Physician Advisor  [facility].

## 2024-04-10 NOTE — ANESTHESIA POSTPROCEDURE EVALUATION
Patient: Yolanda Edwards    Procedure: Procedure(s):  Robot assisted Laparoscopic Partial NEPHRECTOMY       Anesthesia Type:  General    Note:  Disposition: Admission   Postop Pain Control: Challenging            Challenges/Interventions: Acute Pain            Sign Out: Well controlled pain   PONV: No   Neuro/Psych: Uneventful            Sign Out: Acceptable/Baseline neuro status   Airway/Respiratory: Uneventful            Sign Out: Acceptable/Baseline resp. status   CV/Hemodynamics: Uneventful            Sign Out: Acceptable CV status; No obvious hypovolemia; No obvious fluid overload   Other NRE: NONE   DID A NON-ROUTINE EVENT OCCUR? No           Last vitals:  Vitals Value Taken Time   /82 04/09/24 2100   Temp     Pulse 84 04/09/24 2104   Resp 19 04/09/24 2104   SpO2 98 % 04/09/24 2104   Vitals shown include unfiled device data.    Electronically Signed By: Toby Mckeon MD  April 9, 2024  9:05 PM

## 2024-04-10 NOTE — PROGRESS NOTES
Admitted/transferred from: PACU  2 RN full   skin assessment completed by Sandra Valdes, RN and Ethel Mathis, RN.  Skin assessment finding: issues found Abdominal incisions x4 and cliff yeh drain LLQ.     Interventions/actions: skin interventions Educated on repositioning, hydration, and frequent ambulation.      Bedside Emergency Equipment Present:  Suction Regulator: Yes  Suction Canister: Yes  Tubing between Regulator and Canister: Yes  O2 Regulator with Tree: Yes  Ambu Bag: Yes

## 2024-04-11 ENCOUNTER — APPOINTMENT (OUTPATIENT)
Dept: OCCUPATIONAL THERAPY | Facility: CLINIC | Age: 69
End: 2024-04-11
Payer: COMMERCIAL

## 2024-04-11 ENCOUNTER — PRE VISIT (OUTPATIENT)
Dept: UROLOGY | Facility: CLINIC | Age: 69
End: 2024-04-11
Payer: COMMERCIAL

## 2024-04-11 VITALS
BODY MASS INDEX: 21.73 KG/M2 | RESPIRATION RATE: 18 BRPM | DIASTOLIC BLOOD PRESSURE: 68 MMHG | TEMPERATURE: 97.7 F | WEIGHT: 110.67 LBS | HEIGHT: 60 IN | HEART RATE: 72 BPM | SYSTOLIC BLOOD PRESSURE: 147 MMHG | OXYGEN SATURATION: 97 %

## 2024-04-11 DIAGNOSIS — N28.89 RENAL MASS: Primary | ICD-10-CM

## 2024-04-11 LAB
ANION GAP SERPL CALCULATED.3IONS-SCNC: 11 MMOL/L (ref 7–15)
ATRIAL RATE - MUSE: 67 BPM
BUN SERPL-MCNC: 13.1 MG/DL (ref 8–23)
CALCIUM SERPL-MCNC: 8.7 MG/DL (ref 8.8–10.2)
CHLORIDE SERPL-SCNC: 103 MMOL/L (ref 98–107)
CREAT SERPL-MCNC: 0.9 MG/DL (ref 0.51–0.95)
DEPRECATED HCO3 PLAS-SCNC: 22 MMOL/L (ref 22–29)
DIASTOLIC BLOOD PRESSURE - MUSE: NORMAL MMHG
EGFRCR SERPLBLD CKD-EPI 2021: 68 ML/MIN/1.73M2
ERYTHROCYTE [DISTWIDTH] IN BLOOD BY AUTOMATED COUNT: 12.8 % (ref 10–15)
GLUCOSE SERPL-MCNC: 122 MG/DL (ref 70–99)
HCT VFR BLD AUTO: 33.8 % (ref 35–47)
HGB BLD-MCNC: 11.1 G/DL (ref 11.7–15.7)
INTERPRETATION ECG - MUSE: NORMAL
MCH RBC QN AUTO: 31 PG (ref 26.5–33)
MCHC RBC AUTO-ENTMCNC: 32.8 G/DL (ref 31.5–36.5)
MCV RBC AUTO: 94 FL (ref 78–100)
P AXIS - MUSE: 73 DEGREES
PLATELET # BLD AUTO: 196 10E3/UL (ref 150–450)
POTASSIUM SERPL-SCNC: 4 MMOL/L (ref 3.4–5.3)
PR INTERVAL - MUSE: 154 MS
QRS DURATION - MUSE: 86 MS
QT - MUSE: 412 MS
QTC - MUSE: 435 MS
R AXIS - MUSE: 43 DEGREES
RBC # BLD AUTO: 3.58 10E6/UL (ref 3.8–5.2)
SODIUM SERPL-SCNC: 136 MMOL/L (ref 135–145)
SYSTOLIC BLOOD PRESSURE - MUSE: NORMAL MMHG
T AXIS - MUSE: 63 DEGREES
VENTRICULAR RATE- MUSE: 67 BPM
WBC # BLD AUTO: 13.3 10E3/UL (ref 4–11)

## 2024-04-11 PROCEDURE — 80048 BASIC METABOLIC PNL TOTAL CA: CPT | Performed by: STUDENT IN AN ORGANIZED HEALTH CARE EDUCATION/TRAINING PROGRAM

## 2024-04-11 PROCEDURE — 999N000147 HC STATISTIC PT IP EVAL DEFER

## 2024-04-11 PROCEDURE — 258N000003 HC RX IP 258 OP 636: Performed by: STUDENT IN AN ORGANIZED HEALTH CARE EDUCATION/TRAINING PROGRAM

## 2024-04-11 PROCEDURE — 97530 THERAPEUTIC ACTIVITIES: CPT | Mod: GO | Performed by: OCCUPATIONAL THERAPIST

## 2024-04-11 PROCEDURE — 97165 OT EVAL LOW COMPLEX 30 MIN: CPT | Mod: GO | Performed by: OCCUPATIONAL THERAPIST

## 2024-04-11 PROCEDURE — 97535 SELF CARE MNGMENT TRAINING: CPT | Mod: GO | Performed by: OCCUPATIONAL THERAPIST

## 2024-04-11 PROCEDURE — 36415 COLL VENOUS BLD VENIPUNCTURE: CPT | Performed by: STUDENT IN AN ORGANIZED HEALTH CARE EDUCATION/TRAINING PROGRAM

## 2024-04-11 PROCEDURE — 85027 COMPLETE CBC AUTOMATED: CPT | Performed by: STUDENT IN AN ORGANIZED HEALTH CARE EDUCATION/TRAINING PROGRAM

## 2024-04-11 PROCEDURE — 250N000013 HC RX MED GY IP 250 OP 250 PS 637

## 2024-04-11 PROCEDURE — 250N000011 HC RX IP 250 OP 636: Performed by: STUDENT IN AN ORGANIZED HEALTH CARE EDUCATION/TRAINING PROGRAM

## 2024-04-11 PROCEDURE — 97110 THERAPEUTIC EXERCISES: CPT | Mod: GO | Performed by: OCCUPATIONAL THERAPIST

## 2024-04-11 PROCEDURE — 250N000013 HC RX MED GY IP 250 OP 250 PS 637: Performed by: STUDENT IN AN ORGANIZED HEALTH CARE EDUCATION/TRAINING PROGRAM

## 2024-04-11 RX ORDER — HYDROCODONE BITARTRATE AND ACETAMINOPHEN 5; 325 MG/1; MG/1
1 TABLET ORAL EVERY 12 HOURS PRN
COMMUNITY
Start: 2024-04-11

## 2024-04-11 RX ADMIN — OXYCODONE HYDROCHLORIDE 5 MG: 5 TABLET ORAL at 08:27

## 2024-04-11 RX ADMIN — DOCUSATE SODIUM 50 MG AND SENNOSIDES 8.6 MG 1 TABLET: 8.6; 5 TABLET, FILM COATED ORAL at 08:20

## 2024-04-11 RX ADMIN — HYDROMORPHONE HYDROCHLORIDE 0.2 MG: 0.2 INJECTION, SOLUTION INTRAMUSCULAR; INTRAVENOUS; SUBCUTANEOUS at 05:30

## 2024-04-11 RX ADMIN — OXYCODONE HYDROCHLORIDE 5 MG: 5 TABLET ORAL at 01:59

## 2024-04-11 RX ADMIN — ACETAMINOPHEN 975 MG: 325 TABLET, FILM COATED ORAL at 08:19

## 2024-04-11 RX ADMIN — METHOCARBAMOL 500 MG: 500 TABLET ORAL at 05:30

## 2024-04-11 RX ADMIN — SODIUM CHLORIDE: 9 INJECTION, SOLUTION INTRAVENOUS at 09:20

## 2024-04-11 ASSESSMENT — ACTIVITIES OF DAILY LIVING (ADL)
ADLS_ACUITY_SCORE: 27
ADLS_ACUITY_SCORE: 31
ADLS_ACUITY_SCORE: 27
ADLS_ACUITY_SCORE: 27
ADLS_ACUITY_SCORE: 31
PREVIOUS_RESPONSIBILITIES: MEAL PREP;HOUSEKEEPING;LAUNDRY;SHOPPING;YARDWORK;MEDICATION MANAGEMENT;FINANCES;DRIVING
ADLS_ACUITY_SCORE: 31
ADLS_ACUITY_SCORE: 27
ADLS_ACUITY_SCORE: 31
ADLS_ACUITY_SCORE: 27
ADLS_ACUITY_SCORE: 27
IADL_COMMENTS: SO CAN ASSIST AS NEEDED
ADLS_ACUITY_SCORE: 31
ADLS_ACUITY_SCORE: 27
ADLS_ACUITY_SCORE: 31

## 2024-04-11 NOTE — PROGRESS NOTES
04/11/24 1000   Appointment Info   Signing Clinician's Name / Credentials (OT) marah verito ot/l   Living Environment   People in Home significant other   Current Living Arrangements house   Home Accessibility stairs within home;stairs to enter home   Transportation Anticipated car, drives self;family or friend will provide   Self-Care   Usual Activity Tolerance excellent   Current Activity Tolerance good   Regular Exercise Yes   Activity/Exercise Type walking;other (see comments)  (dancing)   Equipment Currently Used at Home none   Fall history within last six months no   Instrumental Activities of Daily Living (IADL)   Previous Responsibilities meal prep;housekeeping;laundry;shopping;yardwork;medication management;finances;driving   IADL Comments SO can assist as needed   General Information   Referring Physician Hugo Ortega   Patient/Family Therapy Goal Statement (OT) return to PLOF   Additional Occupational Profile Info/Pertinent History of Current Problem 69 year old female with PMH of cervical fusions 92, 93 with some persistent pain , 2 Days Post-Op s/p left robotic partial nephrectomy.   Existing Precautions/Restrictions abdominal   General Observations and Info pt moitvated in therapy   Cognitive Status Examination   Orientation Status orientation to person, place and time   Cognitive Status Comments no concerns   Visual Perception   Visual Impairment/Limitations WFL   Sensory   Sensory Quick Adds sensation intact   Range of Motion Comprehensive   General Range of Motion no range of motion deficits identified   Strength Comprehensive (MMT)   General Manual Muscle Testing (MMT) Assessment no strength deficits identified   Coordination   Upper Extremity Coordination No deficits were identified   Bed Mobility   Comment (Bed Mobility) education required.   Transfers   Transfers sit-stand transfer   Sit-Stand Transfer   Sit-Stand [place] (Transfers) contact guard   Balance   Balance Assessment no  deficits identified   Activities of Daily Living   BADL Assessment/Intervention lower body dressing   Lower Body Dressing Assessment/Training   Comment, (Lower Body Dressing) education required.   Clinical Impression   Criteria for Skilled Therapeutic Interventions Met (OT) Yes, treatment indicated   OT Diagnosis decreased ADL I   Risk & Benefits of therapy have been explained evaluation/treatment results reviewed;care plan/treatment goals reviewed;risks/benefits reviewed;current/potential barriers reviewed;participants voiced agreement with care plan;participants included;patient   Clinical Impression Comments pt presents to OT with post surgical precautions and general deconditioning leading to decreased ADL I   OT Total Evaluation Time   OT Eval, Low Complexity Minutes (36259) 5   OT Goals   Therapy Frequency (OT) One time eval and treatment   OT Predicted Duration/Target Date for Goal Attainment 04/11/24   OT Goals Upper Body Dressing;Lower Body Dressing;Bed Mobility;Toilet Transfer/Toileting;OT Goal 1   OT: Upper Body Dressing Modified independent;within precautions   OT: Lower Body Dressing Modified independent;within precautions   OT: Bed Mobility Modified independent;supine to/from sitting;within precautions   OT: Toilet Transfer/Toileting Modified independent;toilet transfer;within precautions   OT: Goal 1 pt will ascend/descend 12 stairs mod I   OT Discharge Planning   OT Plan DISCH   OT Discharge Recommendation (DC Rec) home with assist   OT Rationale for DC Rec pt progressing well   OT Brief overview of current status Pt mod I with all mobility and ADL.   Total Session Time   Total Session Time (sum of timed and untimed services) 5       Physician Attestation   I agree with the information in this note.    Valerie Covington MD

## 2024-04-11 NOTE — PROGRESS NOTES
Pt understood discharge orders/instructions : Yes.  Pt's belongings : Pt took.  Discharge medications : Pt got from pharmacy.  Lines : Piv removed.  How is pt getting home/transportion : Pt's brother.  Discharge papers : Given to the pt.  Follow up appt : As stated on the papers.  Home supply : N/A.

## 2024-04-11 NOTE — TELEPHONE ENCOUNTER
Reason for visit: follow up post op pathology review     Relevant information: partial nephrectomy 4/9/24    Records/imaging/labs/orders: pathology to be resulted, labs scheduled 5/1/24 prior to appointment    Pt called: No need for a call    At Rooming: manasa Avendano  4/11/2024  3:26 PM

## 2024-04-11 NOTE — PLAN OF CARE
Occupational Therapy Discharge Summary    Reason for therapy discharge:    All goals and outcomes met, no further needs identified.    Progress towards therapy goal(s). See goals on Care Plan in [facility] electronic health record for goal details.  Goals met    Therapy recommendation(s):    No further therapy is recommended.    Goal Outcome Evaluation:

## 2024-04-11 NOTE — PLAN OF CARE
Vital signs:  Temp: 98.2  F (36.8  C) Temp src: Oral BP: 138/66 Pulse: 66   Resp: 20 SpO2: 97 % O2 Device: None (Room air)  Height: 152.4 cm (5') Weight: 46 kg (101 lb 6.6 oz)    [id number]:    Activity: Up to bathroom with SBA.   Neuros: A & O x4. Neuro intact. Pala.  Cardiac: WDL. Asymptomatic.  Respiratory: LS clear. O2 sats high 90s on RA. Denies SOB. Unlabored.  GI/: BS hypoactive, denies passing flatus, no BM. Voided 250cc at 2200, PVR 11cc. Voiding adequate.  Diet: Regular diet.  Skin: Lap sites primpore, have small dried drainage, marked, no further drainage noted.   Lines: NS infusing at 75 mL/hr via right PIV.  Drains: SHANTELL leaking copious amounts, dressing changed x3. SHANTELL to bulb suction with 75cc bloody drainage.  Labs: Reviewed.  Pain/nausea: Scheduled Tylenol, Robaxin x2, PRN IV Dilaudid 0.2mg x2, PRN oxycodone 5mg x1 helpful for 8-9/10 generalized achy abdominal pain, patient slept in between cares. Denies nausea.   New changes this shift: None. Provider ordered OT/PT this AM.  Plan: Continue POC.

## 2024-04-11 NOTE — PLAN OF CARE
Physical Therapy: Orders received. Chart reviewed and discussed with care team.? Physical Therapy not indicated due to per discussion with OT; pt IND with all mobility and moving well with pain under control; no acute PT needs identified.? Defer discharge recommendations to OT and care team.? Will complete orders.

## 2024-04-11 NOTE — PROGRESS NOTES
Urology  Progress Note    NAEO  Pain well controlled  reg diet, less oral intake yesterday  Ambulating  Heartburn is improved    Exam  /66 (BP Location: Left arm, Patient Position: Supine, Cuff Size: Adult Regular)   Pulse 66   Temp 98.2  F (36.8  C) (Oral)   Resp 20   Ht 1.524 m (5')   Wt 46 kg (101 lb 6.6 oz)   SpO2 97%   BMI 19.81 kg/m    No acute distress  Unlabored breathing  Abdomen soft,  appropriately tender, nondistended. Incisions CDI  SHANTELL serosanguinous  Choi with clear urine in tubing      /375  SHANTELL 30/15    Labs  Recent Labs   Lab Test 04/10/24  0752 03/20/24  1055   WBC 14.8* 7.7   HGB 10.7* 12.9   CR 0.97* 0.86         AM labs pending    Assessment/Plan  69 year old female with PMH of cervical fusions 92, 93 with some persistent pain , 2 Days Post-Op s/p left robotic partial nephrectomy.     Neuro: Tylenol, prn oxy/dilaudid for pain control  CV: JOSEMANUEL  Pulm: IS while awake  FEN/GI: reg diet, wean mIVF as tolerated. Bowel regimen.  Endo: Euglycemia  : voiding independently  Heme/ID: Hgb stable  Activity: Up ad dorie, PT OT ordered  PPx: SCDs.   Dispo: Likely discharge today pending pain control and toleration of regular diet    Seen and examined with the chief resident. Will discuss with Dr. Covington.    Archie Ortega MD  PGy2  Urology Resident     Contacting the Urology Team     Please use the following job codes to reach the Urology Team. Note that you must use an in house phone and that job codes cannot receive text pages.   On weekdays, dial [phone] (or star-star-star [phone] on the new [employer] telephones) then [phone] to reach the Adult Urology resident or PA on call  On weekdays, dial [phone] (or star-star-star [phone] on the new [employer] telephones) then [phone] to reach the Pediatric Urology resident  On weeknights and weekends, dial [phone] (or star-star-star [phone] on the new [employer] telephones) then [phone] to reach the Urology resident on call (for both Adult and Pediatrics)

## 2024-04-17 LAB
PATH REPORT.COMMENTS IMP SPEC: ABNORMAL
PATH REPORT.COMMENTS IMP SPEC: YES
PATH REPORT.FINAL DX SPEC: ABNORMAL
PATH REPORT.GROSS SPEC: ABNORMAL
PATH REPORT.MICROSCOPIC SPEC OTHER STN: ABNORMAL
PATH REPORT.MICROSCOPIC SPEC OTHER STN: ABNORMAL
PATH REPORT.RELEVANT HX SPEC: ABNORMAL
PHOTO IMAGE: ABNORMAL

## 2024-05-01 ENCOUNTER — OFFICE VISIT (OUTPATIENT)
Dept: UROLOGY | Facility: CLINIC | Age: 69
End: 2024-05-01
Payer: COMMERCIAL

## 2024-05-01 ENCOUNTER — LAB (OUTPATIENT)
Dept: LAB | Facility: CLINIC | Age: 69
End: 2024-05-01
Payer: COMMERCIAL

## 2024-05-01 VITALS — SYSTOLIC BLOOD PRESSURE: 146 MMHG | DIASTOLIC BLOOD PRESSURE: 72 MMHG | HEART RATE: 66 BPM

## 2024-05-01 DIAGNOSIS — N28.89 RENAL MASS: ICD-10-CM

## 2024-05-01 DIAGNOSIS — C64.2 MALIGNANT NEOPLASM OF KIDNEY EXCLUDING RENAL PELVIS, LEFT (H): Primary | ICD-10-CM

## 2024-05-01 LAB
ALBUMIN SERPL BCG-MCNC: 4.2 G/DL (ref 3.5–5.2)
ALP SERPL-CCNC: 84 U/L (ref 40–150)
ALT SERPL W P-5'-P-CCNC: 25 U/L (ref 0–50)
ANION GAP SERPL CALCULATED.3IONS-SCNC: 9 MMOL/L (ref 7–15)
AST SERPL W P-5'-P-CCNC: 30 U/L (ref 0–45)
BILIRUB SERPL-MCNC: 0.3 MG/DL
BUN SERPL-MCNC: 15.5 MG/DL (ref 8–23)
CALCIUM SERPL-MCNC: 9.4 MG/DL (ref 8.8–10.2)
CHLORIDE SERPL-SCNC: 105 MMOL/L (ref 98–107)
CREAT SERPL-MCNC: 0.92 MG/DL (ref 0.51–0.95)
DEPRECATED HCO3 PLAS-SCNC: 25 MMOL/L (ref 22–29)
EGFRCR SERPLBLD CKD-EPI 2021: 67 ML/MIN/1.73M2
ERYTHROCYTE [DISTWIDTH] IN BLOOD BY AUTOMATED COUNT: 12.5 % (ref 10–15)
GLUCOSE SERPL-MCNC: 99 MG/DL (ref 70–99)
HCT VFR BLD AUTO: 36.2 % (ref 35–47)
HGB BLD-MCNC: 11.8 G/DL (ref 11.7–15.7)
MCH RBC QN AUTO: 30.3 PG (ref 26.5–33)
MCHC RBC AUTO-ENTMCNC: 32.6 G/DL (ref 31.5–36.5)
MCV RBC AUTO: 93 FL (ref 78–100)
PLATELET # BLD AUTO: 315 10E3/UL (ref 150–450)
POTASSIUM SERPL-SCNC: 4.1 MMOL/L (ref 3.4–5.3)
PROT SERPL-MCNC: 6.9 G/DL (ref 6.4–8.3)
RBC # BLD AUTO: 3.89 10E6/UL (ref 3.8–5.2)
SODIUM SERPL-SCNC: 139 MMOL/L (ref 135–145)
WBC # BLD AUTO: 7 10E3/UL (ref 4–11)

## 2024-05-01 PROCEDURE — 85027 COMPLETE CBC AUTOMATED: CPT | Performed by: PATHOLOGY

## 2024-05-01 PROCEDURE — 99024 POSTOP FOLLOW-UP VISIT: CPT | Performed by: UROLOGY

## 2024-05-01 PROCEDURE — 80053 COMPREHEN METABOLIC PANEL: CPT | Performed by: PATHOLOGY

## 2024-05-01 PROCEDURE — 36415 COLL VENOUS BLD VENIPUNCTURE: CPT | Performed by: PATHOLOGY

## 2024-05-01 ASSESSMENT — PAIN SCALES - GENERAL: PAINLEVEL: NO PAIN (0)

## 2024-05-01 NOTE — NURSING NOTE
Chief Complaint   Patient presents with    Follow Up     Post op, pathology review       Blood pressure (!) 146/72, pulse 66. There is no height or weight on file to calculate BMI.    Patient Active Problem List   Diagnosis    Post-operative state       Allergies   Allergen Reactions    Morphine Itching, Unknown and Rash       Current Outpatient Medications   Medication Sig Dispense Refill    acetaminophen (TYLENOL) 325 MG tablet Take 2 tablets (650 mg) by mouth every 4 hours as needed for mild pain 100 tablet 0    acyclovir (ZOVIRAX) 800 MG tablet Take 800 mg by mouth 5 times daily For shingles flares      Collagen-Vitamin C-Biotin (COLLAGEN 1500/C PO) 1 tablet by Other route every evening      diphenhydrAMINE-acetaminophen (SM PAIN RELIEVER PM)  MG tablet Take 2 tablets by mouth nightly as needed      HYDROcodone-acetaminophen (NORCO) 5-325 MG tablet Take 1 tablet by mouth every 12 hours as needed for severe pain Take 1 tablet by mouth every 12 hours as needed for severe pain. Do not take while on oxycodone      glucosamine-chondroitin 500-400 MG CAPS per capsule Take 1 capsule by mouth every evening (Patient not taking: Reported on 5/1/2024)      methocarbamol (ROBAXIN) 500 MG tablet Take 1 tablet (500 mg) by mouth 4 times daily as needed for muscle spasms (Abdominal pain) (Patient not taking: Reported on 5/1/2024) 28 tablet 0    oxyCODONE (ROXICODONE) 5 MG tablet Take 0.5 tablets (2.5 mg) by mouth every 6 hours as needed for moderate to severe pain (Patient not taking: Reported on 5/1/2024) 10 tablet 0    senna-docusate (SENOKOT-S/PERICOLACE) 8.6-50 MG tablet Take 1-2 tablets by mouth 2 times daily To prevent or treat constipation (Patient not taking: Reported on 5/1/2024) 45 tablet 0       Social History     Tobacco Use    Smoking status: Never     Passive exposure: Never    Smokeless tobacco: Never   Substance Use Topics    Alcohol use: Yes     Comment: 1-2 drinks a month    Drug use: Never       Ryan  ÁNGEL Avendano  5/1/2024  9:27 AM

## 2024-05-01 NOTE — PROGRESS NOTES
"Urology Clinic     HPI  Yolanda Edwards is a 69 year old female with history of kidney cancer status post robotic left partial nephrectomy, here for follow-up .      The patient denies any significant recovery issues after surgery.   her energy levels are still low but she feels like she is turned the corner.  No changes to health, hospitalizations or new diagnoses in the interim    PHYSICAL EXAM  BP (!) 146/72   Pulse 66    Constitutional: AO, pleasant, NAD  Resp: Non-labored breathing on room air  Abd: Soft, NT, ND, incisions are well-healed CDI    Labs  Lab Results   Component Value Date    WBC 7.0 05/01/2024     Lab Results   Component Value Date    RBC 3.89 05/01/2024     Lab Results   Component Value Date    HGB 11.8 05/01/2024     Lab Results   Component Value Date    HCT 36.2 05/01/2024     No components found for: \"MCT\"  Lab Results   Component Value Date    MCV 93 05/01/2024     Lab Results   Component Value Date    MCH 30.3 05/01/2024     Lab Results   Component Value Date    MCHC 32.6 05/01/2024     Lab Results   Component Value Date    RDW 12.5 05/01/2024     Lab Results   Component Value Date     05/01/2024        Last Comprehensive Metabolic Panel:  Sodium   Date Value Ref Range Status   05/01/2024 139 135 - 145 mmol/L Final     Comment:     Reference intervals for this test were updated on 09/26/2023 to more accurately reflect our healthy population. There may be differences in the flagging of prior results with similar values performed with this method. Interpretation of those prior results can be made in the context of the updated reference intervals.      Potassium   Date Value Ref Range Status   05/01/2024 4.1 3.4 - 5.3 mmol/L Final     Chloride   Date Value Ref Range Status   05/01/2024 105 98 - 107 mmol/L Final     Carbon Dioxide (CO2)   Date Value Ref Range Status   05/01/2024 25 22 - 29 mmol/L Final     Anion Gap   Date Value Ref Range Status   05/01/2024 9 7 - 15 mmol/L Final "     Glucose   Date Value Ref Range Status   05/01/2024 99 70 - 99 mg/dL Final     GLUCOSE BY METER POCT   Date Value Ref Range Status   04/10/2024 142 (H) 70 - 99 mg/dL Final     Urea Nitrogen   Date Value Ref Range Status   05/01/2024 15.5 8.0 - 23.0 mg/dL Final     Creatinine   Date Value Ref Range Status   05/01/2024 0.92 0.51 - 0.95 mg/dL Final     GFR Estimate   Date Value Ref Range Status   05/01/2024 67 >60 mL/min/1.73m2 Final     Calcium   Date Value Ref Range Status   05/01/2024 9.4 8.8 - 10.2 mg/dL Final     Bilirubin Total   Date Value Ref Range Status   05/01/2024 0.3 <=1.2 mg/dL Final     Alkaline Phosphatase   Date Value Ref Range Status   05/01/2024 84 40 - 150 U/L Final     Comment:     Reference intervals for this test were updated on 11/14/2023 to more accurately reflect our healthy population. There may be differences in the flagging of prior results with similar values performed with this method. Interpretation of those prior results can be made in the context of the updated reference intervals.     ALT   Date Value Ref Range Status   05/01/2024 25 0 - 50 U/L Final     Comment:     Reference intervals for this test were updated on 6/12/2023 to more accurately reflect our healthy population. There may be differences in the flagging of prior results with similar values performed with this method. Interpretation of those prior results can be made in the context of the updated reference intervals.       AST   Date Value Ref Range Status   05/01/2024 30 0 - 45 U/L Final     Comment:     Reference intervals for this test were updated on 6/12/2023 to more accurately reflect our healthy population. There may be differences in the flagging of prior results with similar values performed with this method. Interpretation of those prior results can be made in the context of the updated reference intervals.     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

## 2024-05-01 NOTE — LETTER
"5/1/2024       RE: Yolanda Edwards  [address] [facility] [address]     Dear Colleague,    Thank you for referring your patient, Yolanda Edwards, to the [facility] [place] [facility]. Please see a copy of my visit note below.    Urology Clinic     HPI  Yolanda Edwards is a 69 year old female with history of kidney cancer status post robotic left partial nephrectomy, here for follow-up .      The patient denies any significant recovery issues after surgery.   her energy levels are still low but she feels like she is turned the corner.  No changes to health, hospitalizations or new diagnoses in the interim    PHYSICAL EXAM  BP (!) 146/72   Pulse 66    Constitutional: AO, pleasant, NAD  Resp: Non-labored breathing on room air  Abd: Soft, NT, ND, incisions are well-healed CDI    Labs  Lab Results   Component Value Date    WBC 7.0 05/01/2024     Lab Results   Component Value Date    RBC 3.89 05/01/2024     Lab Results   Component Value Date    HGB 11.8 05/01/2024     Lab Results   Component Value Date    HCT 36.2 05/01/2024     No components found for: \"MCT\"  Lab Results   Component Value Date    MCV 93 05/01/2024     Lab Results   Component Value Date    MCH 30.3 05/01/2024     Lab Results   Component Value Date    MCHC 32.6 05/01/2024     Lab Results   Component Value Date    RDW 12.5 05/01/2024     Lab Results   Component Value Date     05/01/2024        Last Comprehensive Metabolic Panel:  Sodium   Date Value Ref Range Status   05/01/2024 139 135 - 145 mmol/L Final     Comment:     Reference intervals for this test were updated on 09/26/2023 to more accurately reflect our healthy population. There may be differences in the flagging of prior results with similar values performed with this method. Interpretation of those prior results can be made in the context of the updated reference intervals.      Potassium   Date Value " Ref Range Status   05/01/2024 4.1 3.4 - 5.3 mmol/L Final     Chloride   Date Value Ref Range Status   05/01/2024 105 98 - 107 mmol/L Final     Carbon Dioxide (CO2)   Date Value Ref Range Status   05/01/2024 25 22 - 29 mmol/L Final     Anion Gap   Date Value Ref Range Status   05/01/2024 9 7 - 15 mmol/L Final     Glucose   Date Value Ref Range Status   05/01/2024 99 70 - 99 mg/dL Final     GLUCOSE BY METER POCT   Date Value Ref Range Status   04/10/2024 142 (H) 70 - 99 mg/dL Final     Urea Nitrogen   Date Value Ref Range Status   05/01/2024 15.5 8.0 - 23.0 mg/dL Final     Creatinine   Date Value Ref Range Status   05/01/2024 0.92 0.51 - 0.95 mg/dL Final     GFR Estimate   Date Value Ref Range Status   05/01/2024 67 >60 mL/min/1.73m2 Final     Calcium   Date Value Ref Range Status   05/01/2024 9.4 8.8 - 10.2 mg/dL Final     Bilirubin Total   Date Value Ref Range Status   05/01/2024 0.3 <=1.2 mg/dL Final     Alkaline Phosphatase   Date Value Ref Range Status   05/01/2024 84 40 - 150 U/L Final     Comment:     Reference intervals for this test were updated on 11/14/2023 to more accurately reflect our healthy population. There may be differences in the flagging of prior results with similar values performed with this method. Interpretation of those prior results can be made in the context of the updated reference intervals.     ALT   Date Value Ref Range Status   05/01/2024 25 0 - 50 U/L Final     Comment:     Reference intervals for this test were updated on 6/12/2023 to more accurately reflect our healthy population. There may be differences in the flagging of prior results with similar values performed with this method. Interpretation of those prior results can be made in the context of the updated reference intervals.       AST   Date Value Ref Range Status   05/01/2024 30 0 - 45 U/L Final     Comment:     Reference intervals for this test were updated on 6/12/2023 to more accurately reflect our healthy population.  There may be differences in the flagging of prior results with similar values performed with this method. Interpretation of those prior results can be made in the context of the updated reference intervals.     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

## 2024-05-03 NOTE — PLAN OF CARE
[facility]      OUTPATIENT OCCUPATIONAL THERAPY  EVALUATION  PLAN OF TREATMENT FOR OUTPATIENT REHABILITATION  (COMPLETE FOR INITIAL CLAIMS ONLY)  Patient's Last Name, First Name, M.I.  YOB: 1955  Yolanda Edwards                          Provider's Name  [facility] Medical Record No.  [id number]                               Onset Date:      Start of Care Date:  04/11/24     Type:     ___PT   _X_OT   ___SLP Medical Diagnosis:  Nephrectomy                        OT Diagnosis:  decreased ADL I   Visits from SOC:  1   _________________________________________________________________________________  Plan of Treatment/Functional Goals    Planned Interventions:     Goals: See Occupational Therapy Goals on Care Plan in [facility] electronic health record.    Therapy Frequency: One time eval and treatment  Predicted Duration of Therapy Intervention: 04/11/24  _________________________________________________________________________________    I CERTIFY THE NEED FOR THESE SERVICES FURNISHED UNDER        THIS PLAN OF TREATMENT AND WHILE UNDER MY CARE     (Physician attestation of this document indicates review and certification of the therapy plan).              Certification date from: 04/11/24, Certification date to: 04/12/24    Referring Physician: Hugo Ortega            Initial Assessment        See Occupational Therapy evaluation dated 04/11/24 in Epic electronic health record.

## 2024-10-25 ENCOUNTER — PRE VISIT (OUTPATIENT)
Dept: UROLOGY | Facility: CLINIC | Age: 69
End: 2024-10-25
Payer: COMMERCIAL

## 2024-10-25 NOTE — TELEPHONE ENCOUNTER
Reason for visit: follow up     Relevant information: kidney cancer, post nephrectomy    Records/imaging/labs/orders: CT renal mass, BMP lab scheduled 10/30/24    Pt called: No need for a call    At Rooming: manasa Avendano  10/25/2024  4:25 AM

## 2024-10-30 ENCOUNTER — ANCILLARY PROCEDURE (OUTPATIENT)
Dept: CT IMAGING | Facility: CLINIC | Age: 69
End: 2024-10-30
Attending: UROLOGY
Payer: COMMERCIAL

## 2024-10-30 ENCOUNTER — LAB (OUTPATIENT)
Dept: LAB | Facility: CLINIC | Age: 69
End: 2024-10-30
Payer: COMMERCIAL

## 2024-10-30 DIAGNOSIS — C64.2 MALIGNANT NEOPLASM OF KIDNEY EXCLUDING RENAL PELVIS, LEFT (H): ICD-10-CM

## 2024-10-30 LAB
ANION GAP SERPL CALCULATED.3IONS-SCNC: 11 MMOL/L (ref 7–15)
BUN SERPL-MCNC: 19.2 MG/DL (ref 8–23)
CALCIUM SERPL-MCNC: 9.9 MG/DL (ref 8.8–10.4)
CHLORIDE SERPL-SCNC: 101 MMOL/L (ref 98–107)
CREAT SERPL-MCNC: 0.92 MG/DL (ref 0.51–0.95)
EGFRCR SERPLBLD CKD-EPI 2021: 67 ML/MIN/1.73M2
GLUCOSE SERPL-MCNC: 103 MG/DL (ref 70–99)
HCO3 SERPL-SCNC: 26 MMOL/L (ref 22–29)
POTASSIUM SERPL-SCNC: 4.5 MMOL/L (ref 3.4–5.3)
SODIUM SERPL-SCNC: 138 MMOL/L (ref 135–145)

## 2024-10-30 PROCEDURE — 74178 CT ABD&PLV WO CNTR FLWD CNTR: CPT | Performed by: RADIOLOGY

## 2024-10-30 PROCEDURE — 36415 COLL VENOUS BLD VENIPUNCTURE: CPT

## 2024-10-30 PROCEDURE — 80048 BASIC METABOLIC PNL TOTAL CA: CPT

## 2024-10-30 RX ORDER — IOPAMIDOL 755 MG/ML
61 INJECTION, SOLUTION INTRAVASCULAR ONCE
Status: COMPLETED | OUTPATIENT
Start: 2024-10-30 | End: 2024-10-30

## 2024-10-30 RX ADMIN — IOPAMIDOL 61 ML: 755 INJECTION, SOLUTION INTRAVASCULAR at 09:06

## 2024-11-06 ENCOUNTER — OFFICE VISIT (OUTPATIENT)
Dept: UROLOGY | Facility: CLINIC | Age: 69
End: 2024-11-06
Payer: COMMERCIAL

## 2024-11-06 VITALS
HEART RATE: 68 BPM | WEIGHT: 100 LBS | HEIGHT: 60 IN | OXYGEN SATURATION: 99 % | DIASTOLIC BLOOD PRESSURE: 71 MMHG | BODY MASS INDEX: 19.63 KG/M2 | SYSTOLIC BLOOD PRESSURE: 163 MMHG

## 2024-11-06 DIAGNOSIS — C64.2 MALIGNANT NEOPLASM OF KIDNEY EXCLUDING RENAL PELVIS, LEFT (H): Primary | ICD-10-CM

## 2024-11-06 ASSESSMENT — PAIN SCALES - GENERAL: PAINLEVEL_OUTOF10: NO PAIN (0)

## 2024-11-06 NOTE — PROGRESS NOTES
"Urology Clinic     HPI  Yolanda Edwards is a 69 year old female with history of kidney cancer status post robotic left partial nephrectomy in 04/2024, here for 6 month follow-up.        The patient denies any significant recovery issues after surgery. She is back at her baseline functioning and activity. Very active, exercising without any issues. No changes to health, hospitalizations or new diagnoses in the interim. Has had high  BP readings recently. Recommended that she discuss this with her PCP at her upcoming appointment.     The patient denies any symptoms today. States that she is heavily exposed to cigarette smoke because of her partner, recommended limiting exposure.     PHYSICAL EXAM  BP (!) 163/71 (BP Location: Left arm, Patient Position: Sitting, Cuff Size: Adult Regular)   Pulse 68   Ht 1.524 m (5')   Wt 45.4 kg (100 lb)   SpO2 99%   BMI 19.53 kg/m     Constitutional: AO, pleasant, NAD  Resp: Non-labored breathing on room air  Abd: Soft, NT, ND, no CVAT      Labs  Lab Results   Component Value Date    WBC 7.0 05/01/2024     Lab Results   Component Value Date    RBC 3.89 05/01/2024     Lab Results   Component Value Date    HGB 11.8 05/01/2024     Lab Results   Component Value Date    HCT 36.2 05/01/2024     No components found for: \"MCT\"  Lab Results   Component Value Date    MCV 93 05/01/2024     Lab Results   Component Value Date    MCH 30.3 05/01/2024     Lab Results   Component Value Date    MCHC 32.6 05/01/2024     Lab Results   Component Value Date    RDW 12.5 05/01/2024     Lab Results   Component Value Date     05/01/2024        Last Comprehensive Metabolic Panel:  Sodium   Date Value Ref Range Status   10/30/2024 138 135 - 145 mmol/L Final     Potassium   Date Value Ref Range Status   10/30/2024 4.5 3.4 - 5.3 mmol/L Final     Chloride   Date Value Ref Range Status   10/30/2024 101 98 - 107 mmol/L Final     Carbon Dioxide (CO2)   Date Value Ref Range Status   10/30/2024 26 22 - 29 " mmol/L Final     Anion Gap   Date Value Ref Range Status   10/30/2024 11 7 - 15 mmol/L Final     Glucose   Date Value Ref Range Status   10/30/2024 103 (H) 70 - 99 mg/dL Final     GLUCOSE BY METER POCT   Date Value Ref Range Status   04/10/2024 142 (H) 70 - 99 mg/dL Final     Urea Nitrogen   Date Value Ref Range Status   10/30/2024 19.2 8.0 - 23.0 mg/dL Final     Creatinine   Date Value Ref Range Status   10/30/2024 0.92 0.51 - 0.95 mg/dL Final     GFR Estimate   Date Value Ref Range Status   10/30/2024 67 >60 mL/min/1.73m2 Final     Comment:     eGFR calculated using 2021 CKD-EPI equation.     Calcium   Date Value Ref Range Status   10/30/2024 9.9 8.8 - 10.4 mg/dL Final     Comment:     Reference intervals for this test were updated on 7/16/2024 to reflect our healthy population more accurately. There may be differences in the flagging of prior results with similar values performed with this method. Those prior results can be interpreted in the context of the updated reference intervals.     Bilirubin Total   Date Value Ref Range Status   05/01/2024 0.3 <=1.2 mg/dL Final     Alkaline Phosphatase   Date Value Ref Range Status   05/01/2024 84 40 - 150 U/L Final     Comment:     Reference intervals for this test were updated on 11/14/2023 to more accurately reflect our healthy population. There may be differences in the flagging of prior results with similar values performed with this method. Interpretation of those prior results can be made in the context of the updated reference intervals.     ALT   Date Value Ref Range Status   05/01/2024 25 0 - 50 U/L Final     Comment:     Reference intervals for this test were updated on 6/12/2023 to more accurately reflect our healthy population. There may be differences in the flagging of prior results with similar values performed with this method. Interpretation of those prior results can be made in the context of the updated reference intervals.       AST   Date Value Ref  Range Status   05/01/2024 30 0 - 45 U/L Final     Comment:     Reference intervals for this test were updated on 6/12/2023 to more accurately reflect our healthy population. There may be differences in the flagging of prior results with similar values performed with this method. Interpretation of those prior results can be made in the context of the updated reference intervals.         Imaging   CT-Renal imaging which was read as below 10/30/2024    IMPRESSION:    1. Postoperative changes of partial left nephrectomy without evidence  of residual or locally recurrent disease.     2. No evidence of metastatic disease within the visualized abdomen.    ASSESSMENT AND PLAN  69 year old female for follow-up s/p Left partial nephrectomy on 4/9/2024. Patient endorses no symptoms today and reports feeling fine and back at baseline functioning.     Plan   - Follow-up in 6 months with CT renal mass, chest x-ray, and BMP    Patient was seen, evaluated and discussed with Dr. Covington who agrees to the plan.    Sandra Meyers MD  Urology PGY-1    Attestation:  I, Valerie Covington MD, saw this patient with the resident and agree with the resident's findings and plan of care. I personally reviewed the medications, vital signs, labs, and imaging. I have reviewed and edited the note above to reflect my findings. The physical exam and any procedures were performed by me and the pertinant details are outlined above.      40 total minutes spent on the date of the encounter including direct interaction with the patient, performing chart review, documentation and further activities as noted above                  no

## 2024-11-06 NOTE — NURSING NOTE
Yolanda Edwards is a 69 year old female patient that presents today in clinic for the following:    Chief Complaint   Patient presents with    Follow Up      kidney cancer, post nephrectomy       The patient's allergies and medications were reviewed as noted. A set of vitals were recorded as noted without incident. The patient does not have any other questions for the provider.    Blood pressure (!) 163/71, pulse 68, height 1.524 m (5'), weight 45.4 kg (100 lb), SpO2 99%. Body mass index is 19.53 kg/m .    Patient Active Problem List   Diagnosis    Post-operative state       Allergies   Allergen Reactions    Morphine Itching, Unknown and Rash       Current Outpatient Medications   Medication Sig Dispense Refill    acetaminophen (TYLENOL) 325 MG tablet Take 2 tablets (650 mg) by mouth every 4 hours as needed for mild pain 100 tablet 0    acyclovir (ZOVIRAX) 800 MG tablet Take 800 mg by mouth 5 times daily For shingles flares      Collagen-Vitamin C-Biotin (COLLAGEN 1500/C PO) 1 tablet by Other route every evening      glucosamine-chondroitin 500-400 MG CAPS per capsule Take 1 capsule by mouth every evening.      diphenhydrAMINE-acetaminophen (SM PAIN RELIEVER PM)  MG tablet Take 2 tablets by mouth nightly as needed (Patient not taking: Reported on 11/6/2024)      HYDROcodone-acetaminophen (NORCO) 5-325 MG tablet Take 1 tablet by mouth every 12 hours as needed for severe pain Take 1 tablet by mouth every 12 hours as needed for severe pain. Do not take while on oxycodone (Patient not taking: Reported on 11/6/2024)      methocarbamol (ROBAXIN) 500 MG tablet Take 1 tablet (500 mg) by mouth 4 times daily as needed for muscle spasms (Abdominal pain) (Patient not taking: Reported on 11/6/2024) 28 tablet 0    oxyCODONE (ROXICODONE) 5 MG tablet Take 0.5 tablets (2.5 mg) by mouth every 6 hours as needed for moderate to severe pain (Patient not taking: Reported on 11/6/2024) 10 tablet 0    senna-docusate  (SENOKOT-S/PERICOLACE) 8.6-50 MG tablet Take 1-2 tablets by mouth 2 times daily To prevent or treat constipation (Patient not taking: Reported on 11/6/2024) 45 tablet 0       Social History     Tobacco Use    Smoking status: Never     Passive exposure: Never    Smokeless tobacco: Never   Substance Use Topics    Alcohol use: Yes     Comment: 1-2 drinks a month    Drug use: Never       Dayami Graham LPN  11/6/2024  11:14 AM

## 2024-11-06 NOTE — LETTER
"11/6/2024       RE: Yolanda Edwards  [address] [facility] [address]     Dear Colleague,    Thank you for referring your patient, Yolanda Edwards, to the [facility] UROLOGY CLINIC [place] [facility]. Please see a copy of my visit note below.    Urology Clinic     HPI  Yolanda Edwards is a 69 year old female with history of kidney cancer status post robotic left partial nephrectomy in 04/2024, here for 6 month follow-up.        The patient denies any significant recovery issues after surgery. She is back at her baseline functioning and activity. Very active, exercising without any issues. No changes to health, hospitalizations or new diagnoses in the interim. Has had high  BP readings recently. Recommended that she discuss this with her PCP at her upcoming appointment.     The patient denies any symptoms today. States that she is heavily exposed to cigarette smoke because of her partner, recommended limiting exposure.     PHYSICAL EXAM  BP (!) 163/71 (BP Location: Left arm, Patient Position: Sitting, Cuff Size: Adult Regular)   Pulse 68   Ht 1.524 m (5')   Wt 45.4 kg (100 lb)   SpO2 99%   BMI 19.53 kg/m     Constitutional: AO, pleasant, NAD  Resp: Non-labored breathing on room air  Abd: Soft, NT, ND, no CVAT      Labs  Lab Results   Component Value Date    WBC 7.0 05/01/2024     Lab Results   Component Value Date    RBC 3.89 05/01/2024     Lab Results   Component Value Date    HGB 11.8 05/01/2024     Lab Results   Component Value Date    HCT 36.2 05/01/2024     No components found for: \"MCT\"  Lab Results   Component Value Date    MCV 93 05/01/2024     Lab Results   Component Value Date    MCH 30.3 05/01/2024     Lab Results   Component Value Date    MCHC 32.6 05/01/2024     Lab Results   Component Value Date    RDW 12.5 05/01/2024     Lab Results   Component Value Date     05/01/2024        Last Comprehensive Metabolic Panel:  Sodium "   Date Value Ref Range Status   10/30/2024 138 135 - 145 mmol/L Final     Potassium   Date Value Ref Range Status   10/30/2024 4.5 3.4 - 5.3 mmol/L Final     Chloride   Date Value Ref Range Status   10/30/2024 101 98 - 107 mmol/L Final     Carbon Dioxide (CO2)   Date Value Ref Range Status   10/30/2024 26 22 - 29 mmol/L Final     Anion Gap   Date Value Ref Range Status   10/30/2024 11 7 - 15 mmol/L Final     Glucose   Date Value Ref Range Status   10/30/2024 103 (H) 70 - 99 mg/dL Final     GLUCOSE BY METER POCT   Date Value Ref Range Status   04/10/2024 142 (H) 70 - 99 mg/dL Final     Urea Nitrogen   Date Value Ref Range Status   10/30/2024 19.2 8.0 - 23.0 mg/dL Final     Creatinine   Date Value Ref Range Status   10/30/2024 0.92 0.51 - 0.95 mg/dL Final     GFR Estimate   Date Value Ref Range Status   10/30/2024 67 >60 mL/min/1.73m2 Final     Comment:     eGFR calculated using 2021 CKD-EPI equation.     Calcium   Date Value Ref Range Status   10/30/2024 9.9 8.8 - 10.4 mg/dL Final     Comment:     Reference intervals for this test were updated on 7/16/2024 to reflect our healthy population more accurately. There may be differences in the flagging of prior results with similar values performed with this method. Those prior results can be interpreted in the context of the updated reference intervals.     Bilirubin Total   Date Value Ref Range Status   05/01/2024 0.3 <=1.2 mg/dL Final     Alkaline Phosphatase   Date Value Ref Range Status   05/01/2024 84 40 - 150 U/L Final     Comment:     Reference intervals for this test were updated on 11/14/2023 to more accurately reflect our healthy population. There may be differences in the flagging of prior results with similar values performed with this method. Interpretation of those prior results can be made in the context of the updated reference intervals.     ALT   Date Value Ref Range Status   05/01/2024 25 0 - 50 U/L Final     Comment:     Reference intervals for this  test were updated on 6/12/2023 to more accurately reflect our healthy population. There may be differences in the flagging of prior results with similar values performed with this method. Interpretation of those prior results can be made in the context of the updated reference intervals.       AST   Date Value Ref Range Status   05/01/2024 30 0 - 45 U/L Final     Comment:     Reference intervals for this test were updated on 6/12/2023 to more accurately reflect our healthy population. There may be differences in the flagging of prior results with similar values performed with this method. Interpretation of those prior results can be made in the context of the updated reference intervals.         Imaging   CT-Renal imaging which was read as below 10/30/2024    IMPRESSION:    1. Postoperative changes of partial left nephrectomy without evidence  of residual or locally recurrent disease.     2. No evidence of metastatic disease within the visualized abdomen.    ASSESSMENT AND PLAN  69 year old female for follow-up s/p Left partial nephrectomy on 4/9/2024. Patient endorses no symptoms today and reports feeling fine and back at baseline functioning.     Plan   - Follow-up in 6 months with CT renal mass, chest x-ray, and BMP    Patient was seen, evaluated and discussed with Dr. Covington who agrees to the plan.    Sandra Meyers MD  Urology PGY-1    Attestation:  I, Valerie Covington MD, saw this patient with the resident and agree with the resident's findings and plan of care. I personally reviewed the medications, vital signs, labs, and imaging. I have reviewed and edited the note above to reflect my findings. The physical exam and any procedures were performed by me and the pertinant details are outlined above.      40 total minutes spent on the date of the encounter including direct interaction with the patient, performing chart review, documentation and further activities as noted above                   Again, thank you  for allowing me to participate in the care of your patient.      Sincerely,    Valerie Covington MD

## 2024-12-02 NOTE — PROGRESS NOTES
Virtual Visit Details    Type of service:  Video Visit     Originating Location (pt. Location): Home    Distant Location (provider location):  On-site  Platform used for Video Visit: [facility]    Urology Oncology Clinic   Renal mass             Chief Complaint:   Left renal mass            Consult or Referral:     Yolanda Edwards is a 68 year old female seen in consultation.         History of Present Illness:    Yolanda Edwards is a very pleasant 68 year old female who underwent cross-sectional imaging which revealed an enhancing left renal mass measured at 2.6 cm.  This showed significant growth since 2021 1.2 cm.  She met with Dr. Guillermina Will who recommended treatment at a tertiary referral center and therefore she was referred to me for further management.    she does not have a history of previous abdominal or retroperitoneal surgery.  There is not a family history of renal cell cancer.  She does not smoke but is exposed to second hand smoking     ECOG Performance Score: 0 - Independent           Past Medical History:   No past medical history on file.         Past Surgical History:   No past surgical history on file.         Problem List:   There are no problems to display for this patient.           Medications     No current outpatient medications on file.     No current facility-administered medications for this visit.            Family History:   No family history on file.         Social History:     Social History     Socioeconomic History    Marital status: Single     Spouse name: Not on file    Number of children: Not on file    Years of education: Not on file    Highest education level: Not on file   Occupational History    Not on file   Tobacco Use    Smoking status: Not on file    Smokeless tobacco: Not on file   Substance and Sexual Activity    Alcohol use: Not on file    Drug use: Not on file    Sexual activity: Not on file   Other Topics Concern    Not on file   Social History Narrative    Not on  file     Social Determinants of Health     Financial Resource Strain: Not on file   Food Insecurity: Not on file   Transportation Needs: Not on file   Physical Activity: Not on file   Stress: Not on file   Social Connections: Not on file   Interpersonal Safety: Not on file   Housing Stability: Not on file            Allergies:   Patient has no allergy information on record.         Review of Systems:  From intake questionnaire     Negative 14 system review except as noted on HPI, nurse's note see below.         Physical Exam:     Patient is a 68 year old  female There is no height or weight on file to calculate BMI.  Constitutional: Vitals: There were no vitals taken for this visit.  General Appearance Adult: Alert, no acute distress, oriented  HENT: throat/mouth:normal, good dentition  Neck: No adenopathy,masses or thyromegaly  Lungs/Repiratory: no respiratory distress, or pursed lip breathing  Heart: No obvious jugular venous distension present, normal heart rate  Abdomen: soft, nontender, no organomegaly or masses  Hematologic/Lymphatics: No cervical or supraclavicular adenopathy  Musculoskeltal: extremities normal, no peripheral edema  Skin: no noted suspicious lesions or rashes  Neuro: Alert, oriented, speech and mentation normal  Psych: affect and mood normal  Gait: Normal without assistance  : deferred          Labs and Pathology:    I personally reviewed all applicable laboratory and pathology data reviewed with patient    Outside labs 4/11/2023   Ref Range & Units 10 mo ago   Sodium  136 - 146 mmol/L 139   Potassium  3.5 - 5.1 mmol/L 4.5   Chloride  98 - 107 mmol/L 106   CO2  22 - 29 mmol/L 27   Anion Gap  10.0 - 20.0 mmol/L 10.5   Creatinine  0.57 - 1.11 mg/dL 0.93   Blood Urea Nitrogen  10.0 - 20.0 mg/dL 14.9   BUN/Creatinine Ratio  11.70 - 22.90 ratio 16.02   Calcium, Total  8.6 - 10.5 mg/dL 9.8   Glucose  70 - 100 mg/dL 100   eGFR  >=60 mL/min/1.73m(2) >60     WBC Count, Corrected  3.7 - 12.1 10(3)/uL  6.7   RBC Count  3.76 - 5.39 10(6)/uL 4.07   Hemoglobin  11.2 - 15.8 g/dL 12.3   Hematocrit  33.9 - 47.5 % 37.2   MCV  80.6 - 98.5 fL 91.5   MCH  26.4 - 32.9 pg 30.2   MCHC  32.0 - 36.0 g/dL 32.9   RDW  10.0 - 16.8 % 13.3   Platelets  179 - 450 10(3)/uL 252   MPV  7.4 - 10.4 fL 8.4   [facility] LABORATORY SERVICES [facility]               Imaging:    I personally viewed all applicable imaging and interpreted them and went over the results with the patient.  Mass/lesion details are as follows    The mass/lesion is located in the Left side and is primarily located in the upper pole.  The tumor is also 50 % endophytic and exophytic.  The mass/lesion primarily lies on the anterior surface.  With regard to the collecting system, the edge of the tumor arrives either abuts the collecting system or arrives within 4 mm of the collecting system.        Outside and Past Medical records:    I spent 20 minutes reviewing outside and past medical records including including the notes from Dr. Guillermina Will on 2/21/24 and labs and imaging listed above          Assessment and Plan:     Assessment:  68 year old female with enhancing Mass Concerning for Renal Cell Cancer      We had a long conversation about the differential diagnosis.  She understands there is a 70 to 80% risk of kidney cancer.  Again discussed different treatment options.  She understand all her questions were answered to her satisfaction.Given the growth in the size of the lesion and the location of it, she is not a good candidate for active surveillance or ablation.  We then discussed surgical options including partial versus radical nephrectomy.  I explained to her that given the location of the tumor and its close proximity to main renal vessels and also the depth of tumor growth, there is a high likelihood that we might need to perform a radical nephrectomy.  However, I will do my best to do a partial nephrectomy as a first option.  The risks of the  procedure including but not limited to infection, bleeding, damage to surrounding structures, urine leak, and more serious side effect such as DVT, PE, and heart attack and stroke were discussed.  Will need to obtain a chest CT scan to complete the staging process.  This will be done locally per patient request.          Plan:  Schedule for robotic left partial versus radical nephrectomy  Obtain CT chest to complete the staging  Scheduled for PAC visit      45 total minutes spent on the date of the encounter including direct interaction with the patient, performing chart review, documentation and further activities as noted above      Valerie Covington MD   Department of Urology   [facility]      good minus

## 2025-03-26 ENCOUNTER — LAB (OUTPATIENT)
Dept: LAB | Facility: CLINIC | Age: 70
End: 2025-03-26
Payer: COMMERCIAL

## 2025-03-26 ENCOUNTER — ANCILLARY PROCEDURE (OUTPATIENT)
Dept: CT IMAGING | Facility: CLINIC | Age: 70
End: 2025-03-26
Attending: UROLOGY
Payer: COMMERCIAL

## 2025-03-26 ENCOUNTER — ANCILLARY PROCEDURE (OUTPATIENT)
Dept: GENERAL RADIOLOGY | Facility: CLINIC | Age: 70
End: 2025-03-26
Attending: UROLOGY
Payer: COMMERCIAL

## 2025-03-26 DIAGNOSIS — C64.2 MALIGNANT NEOPLASM OF KIDNEY EXCLUDING RENAL PELVIS, LEFT (H): ICD-10-CM

## 2025-03-26 LAB
ANION GAP SERPL CALCULATED.3IONS-SCNC: 8 MMOL/L (ref 7–15)
BUN SERPL-MCNC: 19.8 MG/DL (ref 8–23)
CALCIUM SERPL-MCNC: 9.5 MG/DL (ref 8.8–10.4)
CHLORIDE SERPL-SCNC: 101 MMOL/L (ref 98–107)
CREAT BLD-MCNC: 1.1 MG/DL (ref 0.5–1)
CREAT SERPL-MCNC: 0.91 MG/DL (ref 0.51–0.95)
EGFRCR SERPLBLD CKD-EPI 2021: 53 ML/MIN/1.73M2
EGFRCR SERPLBLD CKD-EPI 2021: 67 ML/MIN/1.73M2
GLUCOSE SERPL-MCNC: 96 MG/DL (ref 70–99)
HCO3 SERPL-SCNC: 27 MMOL/L (ref 22–29)
POTASSIUM SERPL-SCNC: 4.5 MMOL/L (ref 3.4–5.3)
SODIUM SERPL-SCNC: 136 MMOL/L (ref 135–145)

## 2025-03-26 PROCEDURE — 82565 ASSAY OF CREATININE: CPT

## 2025-03-26 PROCEDURE — 80048 BASIC METABOLIC PNL TOTAL CA: CPT

## 2025-03-26 PROCEDURE — 36415 COLL VENOUS BLD VENIPUNCTURE: CPT

## 2025-03-26 PROCEDURE — 71046 X-RAY EXAM CHEST 2 VIEWS: CPT | Performed by: RADIOLOGY

## 2025-03-26 PROCEDURE — 74178 CT ABD&PLV WO CNTR FLWD CNTR: CPT | Performed by: RADIOLOGY

## 2025-03-26 RX ORDER — IOPAMIDOL 755 MG/ML
55 INJECTION, SOLUTION INTRAVASCULAR ONCE
Status: COMPLETED | OUTPATIENT
Start: 2025-03-26 | End: 2025-03-26

## 2025-03-26 RX ADMIN — IOPAMIDOL 55 ML: 755 INJECTION, SOLUTION INTRAVASCULAR at 09:08

## 2025-05-15 ENCOUNTER — PRE VISIT (OUTPATIENT)
Dept: UROLOGY | Facility: CLINIC | Age: 70
End: 2025-05-15
Payer: COMMERCIAL

## 2025-05-15 NOTE — TELEPHONE ENCOUNTER
Reason for visit: 6-month follow-up    Dx/Hx/Sx: malignant neoplasm of kidney s/p left partial nephrectomy    Records/imaging/labs/orders: BMP, CT and XR all done on 3/26    At rooming: standard    --  Christiano Penaloza on 5/15/2025 at 3:08 PM

## 2025-06-04 ENCOUNTER — OFFICE VISIT (OUTPATIENT)
Dept: UROLOGY | Facility: CLINIC | Age: 70
End: 2025-06-04
Payer: COMMERCIAL

## 2025-06-04 VITALS
SYSTOLIC BLOOD PRESSURE: 151 MMHG | WEIGHT: 100 LBS | OXYGEN SATURATION: 98 % | HEART RATE: 71 BPM | HEIGHT: 60 IN | BODY MASS INDEX: 19.63 KG/M2 | DIASTOLIC BLOOD PRESSURE: 69 MMHG

## 2025-06-04 DIAGNOSIS — C64.2 MALIGNANT NEOPLASM OF KIDNEY EXCLUDING RENAL PELVIS, LEFT (H): Primary | ICD-10-CM

## 2025-06-04 PROCEDURE — 99215 OFFICE O/P EST HI 40 MIN: CPT | Performed by: UROLOGY

## 2025-06-04 PROCEDURE — 3077F SYST BP >= 140 MM HG: CPT | Performed by: UROLOGY

## 2025-06-04 PROCEDURE — 3078F DIAST BP <80 MM HG: CPT | Performed by: UROLOGY

## 2025-06-04 NOTE — LETTER
"6/4/2025       RE: Yolanda Edwards  [address] [facility] [address]     Dear Colleague,    Thank you for referring your patient, Yolanda Edwards, to the [facility] UROLOGY CLINIC [place] at [facility]. Please see a copy of my visit note below.    Urology Clinic     HPI  Yolanda Edwards is a 70 year old female with history of left kidney cancer status post left robotic partial nephrectomy, here for follow-up.      The patient denies any significant changes to health, hospitalizations or new diagnoses in the interim    PHYSICAL EXAM  BP (!) 151/69 (BP Location: Right arm, Patient Position: Sitting, Cuff Size: Adult Regular)   Pulse 71   Ht 1.524 m (5')   Wt 45.4 kg (100 lb)   SpO2 98%   BMI 19.53 kg/m     Constitutional: AO, pleasant, NAD  Resp: Non-labored breathing on room air  Abd: Soft, NT, ND  ROSY: Deferred   Labs  Lab Results   Component Value Date    WBC 7.0 05/01/2024     Lab Results   Component Value Date    RBC 3.89 05/01/2024     Lab Results   Component Value Date    HGB 11.8 05/01/2024     Lab Results   Component Value Date    HCT 36.2 05/01/2024     No components found for: \"MCT\"  Lab Results   Component Value Date    MCV 93 05/01/2024     Lab Results   Component Value Date    MCH 30.3 05/01/2024     Lab Results   Component Value Date    MCHC 32.6 05/01/2024     Lab Results   Component Value Date    RDW 12.5 05/01/2024     Lab Results   Component Value Date     05/01/2024        Last Comprehensive Metabolic Panel:  Sodium   Date Value Ref Range Status   03/26/2025 136 135 - 145 mmol/L Final     Potassium   Date Value Ref Range Status   03/26/2025 4.5 3.4 - 5.3 mmol/L Final     Chloride   Date Value Ref Range Status   03/26/2025 101 98 - 107 mmol/L Final     Carbon Dioxide (CO2)   Date Value Ref Range Status   03/26/2025 27 22 - 29 mmol/L Final     Anion Gap   Date Value Ref Range Status   03/26/2025 8 7 - 15 mmol/L Final "     Glucose   Date Value Ref Range Status   03/26/2025 96 70 - 99 mg/dL Final     GLUCOSE BY METER POCT   Date Value Ref Range Status   04/10/2024 142 (H) 70 - 99 mg/dL Final     Urea Nitrogen   Date Value Ref Range Status   03/26/2025 19.8 8.0 - 23.0 mg/dL Final     Creatinine   Date Value Ref Range Status   03/26/2025 0.91 0.51 - 0.95 mg/dL Final     Creatinine POCT   Date Value Ref Range Status   03/26/2025 1.1 (H) 0.5 - 1.0 mg/dL Final     GFR Estimate   Date Value Ref Range Status   03/26/2025 67 >60 mL/min/1.73m2 Final     Comment:     eGFR calculated using 2021 CKD-EPI equation.     GFR, ESTIMATED POCT   Date Value Ref Range Status   03/26/2025 53 (L) >60 mL/min/1.73m2 Final     Calcium   Date Value Ref Range Status   03/26/2025 9.5 8.8 - 10.4 mg/dL Final     Bilirubin Total   Date Value Ref Range Status   05/01/2024 0.3 <=1.2 mg/dL Final     Alkaline Phosphatase   Date Value Ref Range Status   05/01/2024 84 40 - 150 U/L Final     Comment:     Reference intervals for this test were updated on 11/14/2023 to more accurately reflect our healthy population. There may be differences in the flagging of prior results with similar values performed with this method. Interpretation of those prior results can be made in the context of the updated reference intervals.     ALT   Date Value Ref Range Status   05/01/2024 25 0 - 50 U/L Final     Comment:     Reference intervals for this test were updated on 6/12/2023 to more accurately reflect our healthy population. There may be differences in the flagging of prior results with similar values performed with this method. Interpretation of those prior results can be made in the context of the updated reference intervals.       AST   Date Value Ref Range Status   05/01/2024 30 0 - 45 U/L Final     Comment:     Reference intervals for this test were updated on 6/12/2023 to more accurately reflect our healthy population. There may be differences in the flagging of prior results  with similar values performed with this method. Interpretation of those prior results can be made in the context of the updated reference intervals.     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      Sincerely,    Valerie Covington MD

## 2025-06-04 NOTE — PROGRESS NOTES
"Urology Clinic     HPI  Yolanda Edwards is a 70 year old female with history of left kidney cancer status post left robotic partial nephrectomy, here for follow-up.      The patient denies any significant changes to health, hospitalizations or new diagnoses in the interim    PHYSICAL EXAM  BP (!) 151/69 (BP Location: Right arm, Patient Position: Sitting, Cuff Size: Adult Regular)   Pulse 71   Ht 1.524 m (5')   Wt 45.4 kg (100 lb)   SpO2 98%   BMI 19.53 kg/m     Constitutional: AO, pleasant, NAD  Resp: Non-labored breathing on room air  Abd: Soft, NT, ND  ROSY: Deferred   Labs  Lab Results   Component Value Date    WBC 7.0 05/01/2024     Lab Results   Component Value Date    RBC 3.89 05/01/2024     Lab Results   Component Value Date    HGB 11.8 05/01/2024     Lab Results   Component Value Date    HCT 36.2 05/01/2024     No components found for: \"MCT\"  Lab Results   Component Value Date    MCV 93 05/01/2024     Lab Results   Component Value Date    MCH 30.3 05/01/2024     Lab Results   Component Value Date    MCHC 32.6 05/01/2024     Lab Results   Component Value Date    RDW 12.5 05/01/2024     Lab Results   Component Value Date     05/01/2024        Last Comprehensive Metabolic Panel:  Sodium   Date Value Ref Range Status   03/26/2025 136 135 - 145 mmol/L Final     Potassium   Date Value Ref Range Status   03/26/2025 4.5 3.4 - 5.3 mmol/L Final     Chloride   Date Value Ref Range Status   03/26/2025 101 98 - 107 mmol/L Final     Carbon Dioxide (CO2)   Date Value Ref Range Status   03/26/2025 27 22 - 29 mmol/L Final     Anion Gap   Date Value Ref Range Status   03/26/2025 8 7 - 15 mmol/L Final     Glucose   Date Value Ref Range Status   03/26/2025 96 70 - 99 mg/dL Final     GLUCOSE BY METER POCT   Date Value Ref Range Status   04/10/2024 142 (H) 70 - 99 mg/dL Final     Urea Nitrogen   Date Value Ref Range Status   03/26/2025 19.8 8.0 - 23.0 mg/dL Final     Creatinine   Date Value Ref Range Status "   03/26/2025 0.91 0.51 - 0.95 mg/dL Final     Creatinine POCT   Date Value Ref Range Status   03/26/2025 1.1 (H) 0.5 - 1.0 mg/dL Final     GFR Estimate   Date Value Ref Range Status   03/26/2025 67 >60 mL/min/1.73m2 Final     Comment:     eGFR calculated using 2021 CKD-EPI equation.     GFR, ESTIMATED POCT   Date Value Ref Range Status   03/26/2025 53 (L) >60 mL/min/1.73m2 Final     Calcium   Date Value Ref Range Status   03/26/2025 9.5 8.8 - 10.4 mg/dL Final     Bilirubin Total   Date Value Ref Range Status   05/01/2024 0.3 <=1.2 mg/dL Final     Alkaline Phosphatase   Date Value Ref Range Status   05/01/2024 84 40 - 150 U/L Final     Comment:     Reference intervals for this test were updated on 11/14/2023 to more accurately reflect our healthy population. There may be differences in the flagging of prior results with similar values performed with this method. Interpretation of those prior results can be made in the context of the updated reference intervals.     ALT   Date Value Ref Range Status   05/01/2024 25 0 - 50 U/L Final     Comment:     Reference intervals for this test were updated on 6/12/2023 to more accurately reflect our healthy population. There may be differences in the flagging of prior results with similar values performed with this method. Interpretation of those prior results can be made in the context of the updated reference intervals.       AST   Date Value Ref Range Status   05/01/2024 30 0 - 45 U/L Final     Comment:     Reference intervals for this test were updated on 6/12/2023 to more accurately reflect our healthy population. There may be differences in the flagging of prior results with similar values performed with this method. Interpretation of those prior results can be made in the context of the updated reference intervals.     Imaging   No clear evidence of local or distant recurrence on my review    ASSESSMENT AND PLAN  70 year old female for followup of low-grade oncocytic  neoplasm x 2 status post robotic left partial nephrectomy 4/5/24 doing well GRISELDA      Plan   Follow-up next year with CT renal mass, chest x-ray, BMP prior    40 total minutes spent on the date of the encounter including direct interaction with the patient, performing chart review, documentation and further activities as noted above    Valerie Covington MD   Department of Urology   [facility]

## 2025-06-04 NOTE — NURSING NOTE
Chief Complaint   Patient presents with    Follow Up     The patient is here for a 6-month follow-up to review recent imaging and labs.        Blood pressure (!) 151/69, pulse 71, height 1.524 m (5'), weight 45.4 kg (100 lb), SpO2 98%. Body mass index is 19.53 kg/m .    Patient Active Problem List   Diagnosis    Post-operative state       Allergies   Allergen Reactions    Morphine Itching, Unknown and Rash       Current Outpatient Medications   Medication Sig Dispense Refill    acetaminophen (TYLENOL) 325 MG tablet Take 2 tablets (650 mg) by mouth every 4 hours as needed for mild pain 100 tablet 0    acyclovir (ZOVIRAX) 800 MG tablet Take 800 mg by mouth 5 times daily For shingles flares      Collagen-Vitamin C-Biotin (COLLAGEN 1500/C PO) 1 tablet by Other route every evening      diphenhydrAMINE-acetaminophen (SM PAIN RELIEVER PM)  MG tablet Take 2 tablets by mouth nightly as needed (Patient not taking: Reported on 6/4/2025)      glucosamine-chondroitin 500-400 MG CAPS per capsule Take 1 capsule by mouth every evening. (Patient not taking: Reported on 6/4/2025)      HYDROcodone-acetaminophen (NORCO) 5-325 MG tablet Take 1 tablet by mouth every 12 hours as needed for severe pain Take 1 tablet by mouth every 12 hours as needed for severe pain. Do not take while on oxycodone (Patient not taking: Reported on 6/4/2025)      methocarbamol (ROBAXIN) 500 MG tablet Take 1 tablet (500 mg) by mouth 4 times daily as needed for muscle spasms (Abdominal pain) (Patient not taking: Reported on 6/4/2025) 28 tablet 0    oxyCODONE (ROXICODONE) 5 MG tablet Take 0.5 tablets (2.5 mg) by mouth every 6 hours as needed for moderate to severe pain (Patient not taking: Reported on 11/6/2024) 10 tablet 0    senna-docusate (SENOKOT-S/PERICOLACE) 8.6-50 MG tablet Take 1-2 tablets by mouth 2 times daily To prevent or treat constipation (Patient not taking: Reported on 6/4/2025) 45 tablet 0       Social History     Tobacco Use    Smoking  status: Never     Passive exposure: Never    Smokeless tobacco: Never   Substance Use Topics    Alcohol use: Yes     Comment: 1-2 drinks a month    Drug use: Never       Christiano Penaloza  6/4/2025  9:11 AM

## (undated) DEVICE — SU VICRYL 0 UR-6 27" J603H

## (undated) DEVICE — SYR 10ML FINGER CONTROL W/O NDL 309695

## (undated) DEVICE — ENDO POUCH UNIVERSAL RETRIEVAL SYSTEM INZII 12/15MM CD004

## (undated) DEVICE — DRAPE SHEET REV FOLD 3/4 9349

## (undated) DEVICE — TUBING FILTER TRI-LUMEN AIRSEAL ASC-EVAC1

## (undated) DEVICE — DAVINCI XI GRASPER PROGRASP 8MM EXT 471093

## (undated) DEVICE — CLIP ENDO HEMO-LOC PURPLE LG 544240

## (undated) DEVICE — SOL WATER IRRIG 1000ML BOTTLE 2F7114

## (undated) DEVICE — SU MONOCRYL 4-0 PS-2 18" UND Y496G

## (undated) DEVICE — ANTIFOG SOLUTION SEE SHARP 150M TROCAR SWABS 30978

## (undated) DEVICE — NDL INSUFFLATION 13GA 120MM C2201

## (undated) DEVICE — SU VICRYL 0 TIE 54" J608H

## (undated) DEVICE — WIPES FOLEY CARE SURESTEP PROVON DFC100

## (undated) DEVICE — DAVINCI XI DRAPE ARM 470015

## (undated) DEVICE — DRAIN JACKSON PRATT ROUND SIL 19FR W/TROCAR LF JP-2232

## (undated) DEVICE — DRAPE U SPLIT 74X120" 29440

## (undated) DEVICE — STPL POWERED ECHELON VASC 35MM PVE35A

## (undated) DEVICE — STRAP UNIVERSAL POSITIONING 2-PIECE 4X47X76" 91-287

## (undated) DEVICE — DAVINCI XI FCP BIPOLAR FENESTRATED 470205

## (undated) DEVICE — PACK DAVINCI UROL

## (undated) DEVICE — SU ETHILON 3-0 PS-1 18" 1663H

## (undated) DEVICE — RX SURGIFLO HEMOSTATIC MATRIX W/THROMBIN 8ML 2994

## (undated) DEVICE — DRAPE IOBAN INCISE 23X17" 6650EZ

## (undated) DEVICE — DRSG PRIMAPORE 02X3" 7133

## (undated) DEVICE — BLADE SWITCH SCISSORS TIP 5MM 89-5100B

## (undated) DEVICE — SU WND CLOSURE VLOC 90 ABS 3-0 VIOLET 6" CV-23 VLOCM0804

## (undated) DEVICE — DAVINCI HOT SHEARS TIP COVER  400180

## (undated) DEVICE — Device

## (undated) DEVICE — LINEN TOWEL PACK X6 WHITE 5487

## (undated) DEVICE — SUCTION MANIFOLD NEPTUNE 2 SYS 4 PORT 0702-020-000

## (undated) DEVICE — SU VICRYL 0 CT-2 27" J334H

## (undated) DEVICE — DEVICE SUTURE PASSER 14GA WECK EFX EFXSP2

## (undated) DEVICE — ENDO TROCAR CONMED AIRSEAL BLADELESS 12X120MM IAS12-120LP

## (undated) DEVICE — DAVINCI XI DRAPE COLUMN 470341

## (undated) DEVICE — LINEN TOWEL PACK X30 5481

## (undated) DEVICE — DRAIN JACKSON PRATT RESERVOIR 100ML SU130-1305

## (undated) DEVICE — GLOVE GAMMEX NEOPRENE ULTRA SZ 7.5 LF 8515

## (undated) DEVICE — DAVINCI XI SEAL UNIVERSAL 5-12MM 470500

## (undated) DEVICE — SURGICEL HEMOSTAT 4X8" 1952

## (undated) DEVICE — DAVINCI XI NDL DRIVER MEGA SUTURE CUT 8MM 470309

## (undated) DEVICE — SU DERMABOND ADVANCED .7ML DNX12

## (undated) DEVICE — DAVINCI XI MONOPOLAR SCISSORS HOT SHEARS 8MM 470179

## (undated) RX ORDER — PROPOFOL 10 MG/ML
INJECTION, EMULSION INTRAVENOUS
Status: DISPENSED
Start: 2024-04-09

## (undated) RX ORDER — ACETAMINOPHEN 325 MG/1
TABLET ORAL
Status: DISPENSED
Start: 2024-04-09

## (undated) RX ORDER — BUPIVACAINE HYDROCHLORIDE 2.5 MG/ML
INJECTION, SOLUTION EPIDURAL; INFILTRATION; INTRACAUDAL
Status: DISPENSED
Start: 2024-04-09

## (undated) RX ORDER — ESMOLOL HYDROCHLORIDE 10 MG/ML
INJECTION INTRAVENOUS
Status: DISPENSED
Start: 2024-04-09

## (undated) RX ORDER — GLYCOPYRROLATE 0.2 MG/ML
INJECTION, SOLUTION INTRAMUSCULAR; INTRAVENOUS
Status: DISPENSED
Start: 2024-04-09

## (undated) RX ORDER — HYDROMORPHONE HCL IN WATER/PF 6 MG/30 ML
PATIENT CONTROLLED ANALGESIA SYRINGE INTRAVENOUS
Status: DISPENSED
Start: 2024-04-09

## (undated) RX ORDER — HYDROMORPHONE HYDROCHLORIDE 1 MG/ML
INJECTION, SOLUTION INTRAMUSCULAR; INTRAVENOUS; SUBCUTANEOUS
Status: DISPENSED
Start: 2024-04-09

## (undated) RX ORDER — OXYCODONE HYDROCHLORIDE 5 MG/1
TABLET ORAL
Status: DISPENSED
Start: 2024-04-09

## (undated) RX ORDER — FENTANYL CITRATE 50 UG/ML
INJECTION, SOLUTION INTRAMUSCULAR; INTRAVENOUS
Status: DISPENSED
Start: 2024-04-09

## (undated) RX ORDER — LABETALOL HYDROCHLORIDE 5 MG/ML
INJECTION, SOLUTION INTRAVENOUS
Status: DISPENSED
Start: 2024-04-09

## (undated) RX ORDER — CEFAZOLIN SODIUM 1 G/3ML
INJECTION, POWDER, FOR SOLUTION INTRAMUSCULAR; INTRAVENOUS
Status: DISPENSED
Start: 2024-04-09

## (undated) RX ORDER — SODIUM CHLORIDE 9 MG/ML
INJECTION, SOLUTION INTRAVENOUS
Status: DISPENSED
Start: 2024-04-09

## (undated) RX ORDER — FENTANYL CITRATE-0.9 % NACL/PF 10 MCG/ML
PLASTIC BAG, INJECTION (ML) INTRAVENOUS
Status: DISPENSED
Start: 2024-04-09

## (undated) RX ORDER — CEFAZOLIN SODIUM/WATER 2 G/20 ML
SYRINGE (ML) INTRAVENOUS
Status: DISPENSED
Start: 2024-04-09

## (undated) RX ORDER — DEXAMETHASONE SODIUM PHOSPHATE 4 MG/ML
INJECTION, SOLUTION INTRA-ARTICULAR; INTRALESIONAL; INTRAMUSCULAR; INTRAVENOUS; SOFT TISSUE
Status: DISPENSED
Start: 2024-04-09

## (undated) RX ORDER — ONDANSETRON 2 MG/ML
INJECTION INTRAMUSCULAR; INTRAVENOUS
Status: DISPENSED
Start: 2024-04-09